# Patient Record
Sex: MALE | Race: BLACK OR AFRICAN AMERICAN | NOT HISPANIC OR LATINO | Employment: OTHER | URBAN - METROPOLITAN AREA
[De-identification: names, ages, dates, MRNs, and addresses within clinical notes are randomized per-mention and may not be internally consistent; named-entity substitution may affect disease eponyms.]

---

## 2018-02-02 ENCOUNTER — HISTORICAL (OUTPATIENT)
Dept: RADIOLOGY | Facility: HOSPITAL | Age: 78
End: 2018-02-02

## 2018-02-02 LAB
ABS NEUT (OLG): 2.09 X10(3)/MCL (ref 2.1–9.2)
ALBUMIN SERPL-MCNC: 4 GM/DL (ref 3.4–5)
ALBUMIN/GLOB SERPL: 1 {RATIO}
ALP SERPL-CCNC: 131 UNIT/L (ref 45–117)
ALT SERPL-CCNC: 33 UNIT/L (ref 16–61)
AST SERPL-CCNC: 24 UNIT/L (ref 15–37)
BASOPHILS NFR BLD MANUAL: 0 %
BILIRUB SERPL-MCNC: 0.3 MG/DL (ref 0.2–1)
BILIRUBIN DIRECT+TOT PNL SERPL-MCNC: 0.1 MG/DL (ref 0–0.2)
BILIRUBIN DIRECT+TOT PNL SERPL-MCNC: 0.2 MG/DL (ref 0–1)
BUN SERPL-MCNC: 19 MG/DL (ref 7–18)
CALCIUM SERPL-MCNC: 9.6 MG/DL (ref 8.5–10.1)
CHLORIDE SERPL-SCNC: 108 MMOL/L (ref 98–107)
CHOLEST SERPL-MCNC: 197 MG/DL (ref 0–200)
CHOLEST/HDLC SERPL: 4.7 {RATIO} (ref 0–5)
CO2 SERPL-SCNC: 27 MMOL/L (ref 21–32)
CREAT SERPL-MCNC: 1.15 MG/DL (ref 0.7–1.3)
EOSINOPHIL NFR BLD MANUAL: 16 % (ref 0–5)
ERYTHROCYTE [DISTWIDTH] IN BLOOD BY AUTOMATED COUNT: 17.9 % (ref 11.5–17)
ERYTHROCYTE [SEDIMENTATION RATE] IN BLOOD: 7 MM/HR (ref 0–20)
GLOBULIN SER-MCNC: 5 GM/DL (ref 2–4)
GLUCOSE SERPL-MCNC: 96 MG/DL (ref 74–106)
GRANULOCYTES NFR BLD MANUAL: 37 %
HCT VFR BLD AUTO: 49.2 % (ref 42–52)
HDLC SERPL-MCNC: 42 MG/DL (ref 35–60)
HGB BLD-MCNC: 15 GM/DL (ref 14–18)
LDLC SERPL CALC-MCNC: 133 MG/DL (ref 0–129)
LYMPHOCYTES NFR BLD MANUAL: 3 %
LYMPHOCYTES NFR BLD MANUAL: 39 % (ref 24–44)
MCH RBC QN AUTO: 22.5 PG (ref 27–31)
MCHC RBC AUTO-ENTMCNC: 30.5 GM/DL (ref 33–36)
MCV RBC AUTO: 73.7 FL (ref 80–94)
MONOCYTES NFR BLD MANUAL: 5 % (ref 4–8)
PLATELET # BLD AUTO: 269 X10(3)/MCL (ref 130–400)
PLATELET # BLD EST: ADEQUATE 10*3/UL
PMV BLD AUTO: 9.3 FL (ref 7.4–10.4)
POTASSIUM SERPL-SCNC: 4.1 MMOL/L (ref 3.5–5.1)
PROT SERPL-MCNC: 9.2 GM/DL (ref 6.4–8.2)
PSA SERPL-MCNC: 2.02 NG/ML (ref 0–4)
RBC # BLD AUTO: 6.68 X10(6)/MCL (ref 4.7–6.1)
RBC MORPH BLD: NORMAL
SODIUM SERPL-SCNC: 143 MMOL/L (ref 136–145)
T3FREE SERPL-MCNC: 2.94 PG/ML (ref 2.18–3.98)
TRIGL SERPL-MCNC: 111 MG/DL (ref 30–150)
TSH SERPL-ACNC: 1.53 MIU/ML (ref 0.36–3.74)
URATE SERPL-MCNC: 6.7 MG/DL (ref 3.5–7.2)
VLDLC SERPL CALC-MCNC: 22 MG/DL
WBC # SPEC AUTO: 6.4 X10(3)/MCL (ref 4.5–11.5)

## 2019-04-18 ENCOUNTER — HISTORICAL (OUTPATIENT)
Dept: CARDIOLOGY | Facility: HOSPITAL | Age: 79
End: 2019-04-18

## 2019-04-18 LAB
ABS NEUT (OLG): 2.89 X10(3)/MCL (ref 2.1–9.2)
ACANTHOCYTES (OLG): 1
ANISOCYTOSIS BLD QL SMEAR: 1
BASOPHILS NFR BLD MANUAL: 2 % (ref 0–2)
BUN SERPL-MCNC: 39 MG/DL (ref 7–18)
CALCIUM SERPL-MCNC: 10 MG/DL (ref 8.5–10.1)
CHLORIDE SERPL-SCNC: 111 MMOL/L (ref 98–107)
CO2 SERPL-SCNC: 23 MMOL/L (ref 21–32)
CREAT SERPL-MCNC: 1.31 MG/DL (ref 0.7–1.3)
CREAT/UREA NIT SERPL: 29.8
EOSINOPHIL NFR BLD MANUAL: 28 % (ref 0–8)
ERYTHROCYTE [DISTWIDTH] IN BLOOD BY AUTOMATED COUNT: 17.3 % (ref 11.5–17)
GLUCOSE SERPL-MCNC: 106 MG/DL (ref 74–106)
HCT VFR BLD AUTO: 40 % (ref 42–52)
HGB BLD-MCNC: 11.6 GM/DL (ref 14–18)
LYMPHOCYTES NFR BLD MANUAL: 17 % (ref 13–40)
MACROCYTES BLD QL SMEAR: 1 /MCL
MCH RBC QN AUTO: 22.9 PG (ref 27–31)
MCHC RBC AUTO-ENTMCNC: 29 GM/DL (ref 33–36)
MCV RBC AUTO: 79.1 FL (ref 80–94)
MICROCYTES BLD QL SMEAR: 1
MONOCYTES NFR BLD MANUAL: 4 % (ref 2–11)
NEUTROPHILS NFR BLD MANUAL: 49 % (ref 47–80)
NRBC BLD MANUAL-RTO: 4 %
OVALOCYTES BLD QL SMEAR: 1
PLATELET # BLD AUTO: 172 X10(3)/MCL (ref 130–400)
PLATELET # BLD EST: NORMAL 10*3/UL
PMV BLD AUTO: ABNORMAL FL (ref 7.4–10.4)
POIKILOCYTOSIS BLD QL SMEAR: 2
POTASSIUM SERPL-SCNC: 4.3 MMOL/L (ref 3.5–5.1)
RBC # BLD AUTO: 5.06 X10(6)/MCL (ref 4.7–6.1)
RBC MORPH BLD: ABNORMAL
SCHISTOCYTES BLD QL AUTO: 1
SODIUM SERPL-SCNC: 140 MMOL/L (ref 136–145)
TARGETS BLD QL SMEAR: 1
WBC # SPEC AUTO: 6.3 X10(3)/MCL (ref 4.5–11.5)

## 2019-06-30 ENCOUNTER — HISTORICAL (OUTPATIENT)
Dept: LAB | Facility: HOSPITAL | Age: 79
End: 2019-06-30

## 2019-06-30 LAB
ABS NEUT (OLG): 2.85 X10(3)/MCL (ref 2.1–9.2)
BUN SERPL-MCNC: 27 MG/DL (ref 7–18)
CALCIUM SERPL-MCNC: 9.6 MG/DL (ref 8.5–10.1)
CHLORIDE SERPL-SCNC: 112 MMOL/L (ref 98–107)
CO2 SERPL-SCNC: 24 MMOL/L (ref 21–32)
CREAT SERPL-MCNC: 1.38 MG/DL (ref 0.7–1.3)
CREAT/UREA NIT SERPL: 20
ERYTHROCYTE [DISTWIDTH] IN BLOOD BY AUTOMATED COUNT: 18.4 % (ref 11.5–17)
GLUCOSE SERPL-MCNC: 144 MG/DL (ref 74–106)
HCT VFR BLD AUTO: 39.5 % (ref 42–52)
HGB BLD-MCNC: 12.1 GM/DL (ref 14–18)
INR PPP: 1.2 (ref 0–1.3)
MAGNESIUM SERPL-MCNC: 1.9 MG/DL (ref 1.8–2.4)
MCH RBC QN AUTO: 22.6 PG (ref 27–31)
MCHC RBC AUTO-ENTMCNC: 30.6 GM/DL (ref 33–36)
MCV RBC AUTO: 73.7 FL (ref 80–94)
PLATELET # BLD AUTO: 201 X10(3)/MCL (ref 130–400)
PMV BLD AUTO: ABNORMAL FL (ref 7.4–10.4)
POTASSIUM SERPL-SCNC: 4.1 MMOL/L (ref 3.5–5.1)
PROTHROMBIN TIME: 15.4 SECOND(S) (ref 12–14)
RBC # BLD AUTO: 5.36 X10(6)/MCL (ref 4.7–6.1)
SODIUM SERPL-SCNC: 142 MMOL/L (ref 136–145)
WBC # SPEC AUTO: 5.9 X10(3)/MCL (ref 4.5–11.5)

## 2019-07-02 ENCOUNTER — HISTORICAL (OUTPATIENT)
Dept: CARDIOLOGY | Facility: HOSPITAL | Age: 79
End: 2019-07-02

## 2020-08-18 ENCOUNTER — HISTORICAL (OUTPATIENT)
Dept: RESPIRATORY THERAPY | Facility: HOSPITAL | Age: 80
End: 2020-08-18

## 2022-02-15 ENCOUNTER — HISTORICAL (OUTPATIENT)
Dept: ADMINISTRATIVE | Facility: HOSPITAL | Age: 82
End: 2022-02-15

## 2022-07-08 ENCOUNTER — HOSPITAL ENCOUNTER (EMERGENCY)
Facility: HOSPITAL | Age: 82
Discharge: HOME | End: 2022-07-08
Attending: EMERGENCY MEDICINE
Payer: MEDICARE

## 2022-07-08 VITALS
DIASTOLIC BLOOD PRESSURE: 60 MMHG | SYSTOLIC BLOOD PRESSURE: 105 MMHG | HEIGHT: 71 IN | HEART RATE: 62 BPM | OXYGEN SATURATION: 99 % | TEMPERATURE: 98 F | RESPIRATION RATE: 20 BRPM

## 2022-07-08 DIAGNOSIS — K04.7 DENTAL ABSCESS: Primary | ICD-10-CM

## 2022-07-08 PROCEDURE — 99284 EMERGENCY DEPT VISIT MOD MDM: CPT

## 2022-07-08 RX ORDER — HYDROCODONE BITARTRATE AND ACETAMINOPHEN 7.5; 325 MG/1; MG/1
1 TABLET ORAL EVERY 6 HOURS PRN
Qty: 20 TABLET | Refills: 0 | Status: SHIPPED | OUTPATIENT
Start: 2022-07-08 | End: 2022-07-13

## 2022-07-08 RX ORDER — AMOXICILLIN AND CLAVULANATE POTASSIUM 875; 125 MG/1; MG/1
1 TABLET, FILM COATED ORAL 2 TIMES DAILY
Qty: 20 TABLET | Refills: 0 | Status: SHIPPED | OUTPATIENT
Start: 2022-07-08 | End: 2022-07-18

## 2022-07-08 NOTE — ED PROVIDER NOTES
Encounter Date: 7/8/2022       History     Chief Complaint   Patient presents with    Dental Pain     Reports dental pain for 2 weeks. Swelling noted to L face     The history is provided by the patient. No  was used.   Dental Pain  The primary symptoms include mouth pain. Primary symptoms do not include fever, shortness of breath or sore throat. The symptoms began several days ago. The symptoms are worsening. The symptoms are new. The symptoms occur constantly.   Mouth pain began 5 - 7 days ago. Mouth pain occurs constantly. Mouth pain is worsening. Affected locations include: gum(s) and teeth.   Additional symptoms include: gum swelling, gum tenderness, jaw pain and facial swelling. Medical issues include: periodontal disease.     Review of patient's allergies indicates:  No Known Allergies  No past medical history on file. HTN, HLD, CAD  No past surgical history on file. noncontributory  No family history on file.     Review of Systems   Constitutional: Negative for fever.   HENT: Positive for facial swelling. Negative for sore throat.    Respiratory: Negative for shortness of breath.    Cardiovascular: Negative for chest pain.   Gastrointestinal: Negative for nausea.   Genitourinary: Negative for dysuria.   Musculoskeletal: Negative for back pain.   Skin: Negative for rash.   Neurological: Negative for weakness.   Hematological: Does not bruise/bleed easily.       Physical Exam     Initial Vitals [07/08/22 1059]   BP Pulse Resp Temp SpO2   (!) 100/59 (!) 59 18 98.4 °F (36.9 °C) 98 %      MAP       --         Physical Exam    Nursing note and vitals reviewed.  Constitutional: He appears well-developed and well-nourished.   HENT:   Head: Normocephalic and atraumatic.       Right Ear: External ear normal.   Left Ear: External ear normal.   Nose: Nose normal.   Mouth/Throat:       Eyes: Conjunctivae and EOM are normal. Pupils are equal, round, and reactive to light.   Neck: Neck supple.   Normal  range of motion.  Cardiovascular: Normal rate, regular rhythm, normal heart sounds and intact distal pulses.   Pulmonary/Chest: Breath sounds normal.   Abdominal: Abdomen is soft. Bowel sounds are normal.   Musculoskeletal:         General: Normal range of motion.      Cervical back: Normal range of motion and neck supple.     Neurological: He is alert and oriented to person, place, and time. He has normal strength. GCS score is 15. GCS eye subscore is 4. GCS verbal subscore is 5. GCS motor subscore is 6.   Skin: Skin is warm and dry. Capillary refill takes less than 2 seconds.   Psychiatric: He has a normal mood and affect. His behavior is normal. Judgment and thought content normal.         ED Course   Procedures  Labs Reviewed - No data to display       Imaging Results    None          Medications - No data to display      Pt is on Eliquis - will avoid NSAID's due to bleeding risk.                 Clinical Impression:   Final diagnoses:  [K04.7] Dental abscess (Primary)          ED Disposition Condition    Discharge Stable        ED Prescriptions     Medication Sig Dispense Start Date End Date Auth. Provider    amoxicillin-clavulanate 875-125mg (AUGMENTIN) 875-125 mg per tablet Take 1 tablet by mouth 2 (two) times daily. for 10 days 20 tablet 7/8/2022 7/18/2022 Demarcus Nice MD    HYDROcodone-acetaminophen (NORCO) 7.5-325 mg per tablet Take 1 tablet by mouth every 6 (six) hours as needed for Pain. 20 tablet 7/8/2022 7/13/2022 Demarcus Nice MD        Follow-up Information     Follow up With Specialties Details Why Contact Info    Follow up with a dentist as soon as possible               Demarcus Nice MD  07/08/22 [id number]

## 2022-09-02 ENCOUNTER — HOSPITAL ENCOUNTER (EMERGENCY)
Facility: HOSPITAL | Age: 82
Discharge: HOME | End: 2022-09-02
Attending: EMERGENCY MEDICINE
Payer: MEDICARE

## 2022-09-02 VITALS
WEIGHT: 205 LBS | HEART RATE: 86 BPM | SYSTOLIC BLOOD PRESSURE: 145 MMHG | HEIGHT: 71 IN | DIASTOLIC BLOOD PRESSURE: 74 MMHG | TEMPERATURE: 98 F | OXYGEN SATURATION: 97 % | RESPIRATION RATE: 16 BRPM | BODY MASS INDEX: 28.7 KG/M2

## 2022-09-02 DIAGNOSIS — M54.50 ACUTE BILATERAL LOW BACK PAIN WITHOUT SCIATICA: ICD-10-CM

## 2022-09-02 DIAGNOSIS — M54.50 ACUTE EXACERBATION OF CHRONIC LOW BACK PAIN: Primary | ICD-10-CM

## 2022-09-02 DIAGNOSIS — G89.29 ACUTE EXACERBATION OF CHRONIC LOW BACK PAIN: Primary | ICD-10-CM

## 2022-09-02 PROCEDURE — 99283 EMERGENCY DEPT VISIT LOW MDM: CPT | Mod: 25

## 2022-09-02 PROCEDURE — 25000003 PHARM REV CODE 250: Performed by: EMERGENCY MEDICINE

## 2022-09-02 RX ORDER — HYDROCODONE BITARTRATE AND ACETAMINOPHEN 5; 325 MG/1; MG/1
1 TABLET ORAL EVERY 6 HOURS PRN
Qty: 12 TABLET | Refills: 0 | Status: SHIPPED | OUTPATIENT
Start: 2022-09-02

## 2022-09-02 RX ORDER — HYDROCODONE BITARTRATE AND ACETAMINOPHEN 5; 325 MG/1; MG/1
1 TABLET ORAL
Status: COMPLETED | OUTPATIENT
Start: 2022-09-02 | End: 2022-09-02

## 2022-09-02 RX ADMIN — HYDROCODONE BITARTRATE AND ACETAMINOPHEN 1 TABLET: 5; 325 TABLET ORAL at 09:09

## 2022-09-02 NOTE — ED PROVIDER NOTES
Encounter Date: 9/2/2022       History     Chief Complaint   Patient presents with    Back Pain     81-year-old male with a history of chronic back pain states that he did some yard work last week and has been having back pain much worse than usual.  No radiation of the pain in his legs, no difficulty walking, but the pain is severe and the mid low back.  He states he knows he has arthritis but he still bends over and picks things up, and then hurt himself.  No abdominal pain or other symptoms.  no bowel or bladder complaints.      Review of patient's allergies indicates:  No Known Allergies  Past Medical History:   Diagnosis Date    Hypertension      Past Surgical History:   Procedure Laterality Date    INSERTION OF PACEMAKER       No family history on file.  Social History     Tobacco Use    Smoking status: Former     Types: Cigarettes    Smokeless tobacco: Never     Review of Systems   Musculoskeletal:  Positive for back pain.   All other systems reviewed and are negative.    Physical Exam     Initial Vitals [09/02/22 0849]   BP Pulse Resp Temp SpO2   (!) 145/74 86 20 97.7 °F (36.5 °C) 97 %      MAP       --         Physical Exam    Nursing note and vitals reviewed.  Constitutional: Vital signs are normal. He appears well-developed and well-nourished.   HENT:   Head: Normocephalic and atraumatic.   Eyes: Pupils are equal, round, and reactive to light.   Neck: Neck supple.   Cardiovascular:  Normal rate, regular rhythm and normal heart sounds.           Pulmonary/Chest: Breath sounds normal. No respiratory distress.   Musculoskeletal:      Cervical back: Neck supple. No edema or erythema.      Comments: Ambulatory without assistance, mild tenderness to the entire lumbar region, no weakness or numbness in his legs     Neurological: He is alert.   Skin: Skin is warm and dry.       ED Course   Procedures  Labs Reviewed - No data to display       Imaging Results              X-Ray Lumbar Spine Ap And Lateral (Final  result)  Result time 09/02/22 09:54:07      Final result by So Valencia MD (09/02/22 09:54:07)                   Impression:      1. No acute abnormality identified.  2. Degenerative changes of the lumbar spine.      Electronically signed by: So Valencia  Date:    09/02/2022  Time:    09:54               Narrative:    EXAMINATION:  XR LUMBAR SPINE AP AND LATERAL    CLINICAL HISTORY:  back pain;    COMPARISON:  None.    FINDINGS:  There are 5 non-rib-bearing lumbar type vertebral bodies.  There is straightening of normal lumbar lordosis.  The vertebral heights are maintained.  There is severe disc height loss at L3 through L5, moderate at L2-L3 and L5-S1, with multilevel marginal osteophyte formation.  There is moderate to severe facet arthropathy.  Vascular calcifications are noted.                                       Medications   HYDROcodone-acetaminophen 5-325 mg per tablet 1 tablet (1 tablet Oral Given 9/2/22 0938)                          Clinical Impression:   Final diagnoses:  [M54.50, G89.29] Acute exacerbation of chronic low back pain (Primary)  [M54.50] Acute bilateral low back pain without sciatica      ED Disposition Condition    Discharge Stable          ED Prescriptions       Medication Sig Dispense Start Date End Date Auth. Provider    HYDROcodone-acetaminophen (NORCO) 5-325 mg per tablet Take 1 tablet by mouth every 6 (six) hours as needed for Pain. 12 tablet 9/2/2022 -- Selena Morris MD          Follow-up Information       Follow up With Specialties Details Why Contact Info    Braxton Bingham MD Family Medicine Schedule an appointment as soon as possible for a visit in 1 week  [address] [facility] [address]  [phone]               Selena Morris MD  09/02/22 2338

## 2023-10-12 ENCOUNTER — LAB VISIT (OUTPATIENT)
Dept: LAB | Facility: HOSPITAL | Age: 83
End: 2023-10-12
Attending: INTERNAL MEDICINE
Payer: MEDICARE

## 2023-10-12 DIAGNOSIS — I10 HYPERTENSION, UNSPECIFIED TYPE: Primary | ICD-10-CM

## 2023-10-12 LAB
ANION GAP SERPL CALC-SCNC: 11 MEQ/L
BUN SERPL-MCNC: 21.8 MG/DL (ref 8.4–25.7)
CALCIUM SERPL-MCNC: 10.3 MG/DL (ref 8.8–10)
CHLORIDE SERPL-SCNC: 109 MMOL/L (ref 98–107)
CO2 SERPL-SCNC: 24 MMOL/L (ref 23–31)
CREAT SERPL-MCNC: 1.23 MG/DL (ref 0.73–1.18)
CREAT/UREA NIT SERPL: 18
GFR SERPLBLD CREATININE-BSD FMLA CKD-EPI: 59 MLS/MIN/1.73/M2
GLUCOSE SERPL-MCNC: 113 MG/DL (ref 82–115)
POTASSIUM SERPL-SCNC: 3.8 MMOL/L (ref 3.5–5.1)
SODIUM SERPL-SCNC: 144 MMOL/L (ref 136–145)

## 2023-10-12 PROCEDURE — 80048 BASIC METABOLIC PNL TOTAL CA: CPT

## 2023-10-12 PROCEDURE — 36415 COLL VENOUS BLD VENIPUNCTURE: CPT

## 2024-05-15 ENCOUNTER — OFFICE VISIT (OUTPATIENT)
Dept: INTERNAL MEDICINE | Facility: CLINIC | Age: 84
End: 2024-05-15
Payer: MEDICARE

## 2024-05-15 VITALS
TEMPERATURE: 97 F | BODY MASS INDEX: 27.02 KG/M2 | OXYGEN SATURATION: 97 % | HEIGHT: 71 IN | HEART RATE: 60 BPM | SYSTOLIC BLOOD PRESSURE: 130 MMHG | DIASTOLIC BLOOD PRESSURE: 66 MMHG | WEIGHT: 193 LBS

## 2024-05-15 DIAGNOSIS — I10 PRIMARY HYPERTENSION: Chronic | ICD-10-CM

## 2024-05-15 DIAGNOSIS — I25.10 CORONARY ARTERY DISEASE INVOLVING NATIVE CORONARY ARTERY OF NATIVE HEART WITHOUT ANGINA PECTORIS: ICD-10-CM

## 2024-05-15 DIAGNOSIS — Z76.89 ENCOUNTER TO ESTABLISH CARE: Primary | ICD-10-CM

## 2024-05-15 DIAGNOSIS — F51.01 PRIMARY INSOMNIA: Chronic | ICD-10-CM

## 2024-05-15 DIAGNOSIS — M1A.0711 IDIOPATHIC CHRONIC GOUT OF RIGHT FOOT WITH TOPHUS: ICD-10-CM

## 2024-05-15 DIAGNOSIS — I48.20 ATRIAL FIBRILLATION, CHRONIC: ICD-10-CM

## 2024-05-15 DIAGNOSIS — I73.9 PAD (PERIPHERAL ARTERY DISEASE): ICD-10-CM

## 2024-05-15 DIAGNOSIS — M51.37 DDD (DEGENERATIVE DISC DISEASE), LUMBOSACRAL: ICD-10-CM

## 2024-05-15 PROBLEM — M51.379 DDD (DEGENERATIVE DISC DISEASE), LUMBOSACRAL: Status: ACTIVE | Noted: 2024-05-15

## 2024-05-15 PROBLEM — Z87.891 HISTORY OF TOBACCO ABUSE: Status: ACTIVE | Noted: 2024-05-15

## 2024-05-15 PROBLEM — M10.9 GOUT: Status: ACTIVE | Noted: 2024-05-15

## 2024-05-15 PROCEDURE — 3078F DIAST BP <80 MM HG: CPT | Mod: CPTII,,, | Performed by: NURSE PRACTITIONER

## 2024-05-15 PROCEDURE — 1159F MED LIST DOCD IN RCRD: CPT | Mod: CPTII,,, | Performed by: NURSE PRACTITIONER

## 2024-05-15 PROCEDURE — 3075F SYST BP GE 130 - 139MM HG: CPT | Mod: CPTII,,, | Performed by: NURSE PRACTITIONER

## 2024-05-15 PROCEDURE — 99215 OFFICE O/P EST HI 40 MIN: CPT | Mod: PBBFAC | Performed by: NURSE PRACTITIONER

## 2024-05-15 PROCEDURE — 1101F PT FALLS ASSESS-DOCD LE1/YR: CPT | Mod: CPTII,,, | Performed by: NURSE PRACTITIONER

## 2024-05-15 PROCEDURE — 3288F FALL RISK ASSESSMENT DOCD: CPT | Mod: CPTII,,, | Performed by: NURSE PRACTITIONER

## 2024-05-15 PROCEDURE — 1160F RVW MEDS BY RX/DR IN RCRD: CPT | Mod: CPTII,,, | Performed by: NURSE PRACTITIONER

## 2024-05-15 PROCEDURE — 99204 OFFICE O/P NEW MOD 45 MIN: CPT | Mod: S$PBB,,, | Performed by: NURSE PRACTITIONER

## 2024-05-15 PROCEDURE — 1125F AMNT PAIN NOTED PAIN PRSNT: CPT | Mod: CPTII,,, | Performed by: NURSE PRACTITIONER

## 2024-05-15 RX ORDER — TRAZODONE HYDROCHLORIDE 50 MG/1
50 TABLET ORAL NIGHTLY
Qty: 90 TABLET | Refills: 1 | Status: SHIPPED | OUTPATIENT
Start: 2024-05-15

## 2024-05-15 RX ORDER — FUROSEMIDE 20 MG/1
20 TABLET ORAL
COMMUNITY
Start: 2024-05-07

## 2024-05-15 RX ORDER — ALLOPURINOL 300 MG/1
300 TABLET ORAL DAILY
Qty: 90 TABLET | Refills: 1 | Status: SHIPPED | OUTPATIENT
Start: 2024-05-15

## 2024-05-15 RX ORDER — LOSARTAN POTASSIUM 100 MG/1
100 TABLET ORAL
COMMUNITY
Start: 2024-05-07

## 2024-05-15 RX ORDER — ALLOPURINOL 300 MG/1
300 TABLET ORAL
COMMUNITY
Start: 2024-05-07 | End: 2024-05-15 | Stop reason: SDUPTHER

## 2024-05-15 RX ORDER — TRAZODONE HYDROCHLORIDE 50 MG/1
50 TABLET ORAL NIGHTLY
COMMUNITY
Start: 2024-03-25 | End: 2024-05-15 | Stop reason: SDUPTHER

## 2024-05-15 RX ORDER — DICLOFENAC SODIUM 10 MG/G
2 GEL TOPICAL 3 TIMES DAILY PRN
Qty: 100 G | Refills: 1 | Status: SHIPPED | OUTPATIENT
Start: 2024-05-15

## 2024-05-15 RX ORDER — ISOSORBIDE MONONITRATE 60 MG/1
60 TABLET, EXTENDED RELEASE ORAL
COMMUNITY
Start: 2024-05-07

## 2024-05-15 RX ORDER — LEVOCETIRIZINE DIHYDROCHLORIDE 5 MG/1
5 TABLET, FILM COATED ORAL
COMMUNITY
Start: 2024-05-07

## 2024-05-15 RX ORDER — APIXABAN 5 MG/1
5 TABLET, FILM COATED ORAL 2 TIMES DAILY
COMMUNITY
Start: 2024-05-07

## 2024-05-15 RX ORDER — AMLODIPINE BESYLATE 10 MG/1
10 TABLET ORAL
COMMUNITY
Start: 2024-05-07

## 2024-05-15 RX ORDER — ISOSORBIDE MONONITRATE 30 MG/1
30 TABLET, EXTENDED RELEASE ORAL
COMMUNITY
Start: 2024-05-07

## 2024-05-15 RX ORDER — ATORVASTATIN CALCIUM 40 MG/1
40 TABLET, FILM COATED ORAL
COMMUNITY
Start: 2024-05-07

## 2024-05-15 RX ORDER — CARVEDILOL 3.12 MG/1
3.12 TABLET ORAL 2 TIMES DAILY
COMMUNITY

## 2024-05-15 NOTE — PROGRESS NOTES
Charley Townsend, GABY   [facility]  [address] [facility] [address]      PATIENT NAME: Hung Ugarte  : 1940  DATE: 5/15/24  MRN: [id number]      Reason for Visit / Chief Complaint: Establish Care       History of Present Illness / Problem Focused Workflow     uHng Ugarte is a 83 y.o. Black or  male presents to the clinic to establish primary care. Medical problems include HTN, CAD, PAD (stents BLE), A. Fib, pacemaker, gout, DDD lumbosacral, LBP w/sciatica, insomnia, and hx tobacco abuse (quit about 20 yrs ago). Followed by Dr. Yao Daniels, cardiology. Pt unable to read or write.    Pt presents to establish primary care; former PCP was Carmina Kruger NP in [place]. Pt reports LV with PCP was in March. Last visit with cardio was April with 6 month f/u. Had labs drawn with last cardio visit. Reports med compliance. Attempts low sodium diet. BP at goal. Takes trazodone for insomnia. Reports intermittent right LBP, onset several years, worse with certain movements and somewhat improves with tylenol. Has continued smoking cessation since probably in the ; pt unsure of how long he actually smoked. Pt is not fasting today. Denies chest pain, shortness of breath, cough, fever, headache, dizziness, weakness, abdominal pain, nausea, vomiting, diarrhea, constipation, dysuria, depression, anxiety, SI/HI.    Review of Systems     Review of Systems     See HPI for details    Constitutional: Denies Change in appetite. Denies Chills. Denies Fever. Denies Night sweats.  Eye: Denies Blurred vision. Denies Discharge. Denies Eye pain.  ENT: Denies Decreased hearing. Denies Sore throat. Denies Swollen glands.  Respiratory: Denies Cough. Denies Shortness of breath. Denies Shortness of breath with exertion. Denies Wheezing.  Cardiovascular: Denies Chest pain at rest. Denies Chest pain with exertion. Denies Irregular  heartbeat. Denies Palpitations. Denies Edema.  Gastrointestinal: Denies Abdominal pain. Denies Diarrhea. Denies Nausea. Denies Vomiting. Denies Hematemesis or Hematochezia.  Genitourinary: Denies Dysuria. Denies Urinary frequency. Denies Urinary urgency. Denies Blood in urine.  Endocrine: Denies Cold intolerance. Denies Excessive thirst. Denies Heat intolerance. Denies Weight loss. Denies Weight gain.  Musculoskeletal: Admits Painful joints. Denies Weakness.  Integumentary: Denies Rash. Denies Itching. Denies Dry skin.  Neurologic: Denies Dizziness. Denies Fainting. Denies Headache.  Psychiatric: Denies Depression. Denies Anxiety. Denies Suicidal/Homicidal ideations.    All Other ROS: Negative except as stated in HPI.     Medical / Surgical / Social / Family History       -------------------------------------    Hypertension        Past Surgical History:   Procedure Laterality Date    INSERTION OF PACEMAKER         Social History     Socioeconomic History    Marital status:    Tobacco Use    Smoking status: Former     Types: Cigarettes    Smokeless tobacco: Never   Substance and Sexual Activity    Alcohol use: Not Currently    Drug use: Never    Sexual activity: Not Currently        No family history on file.     Medications and Allergies     Medications  Current Outpatient Medications   Medication Instructions    allopurinoL (ZYLOPRIM) 300 mg, Oral, Daily    amLODIPine (NORVASC) 10 mg, Oral    atorvastatin (LIPITOR) 40 mg, Oral    carvediloL (COREG) 3.125 mg, Oral, 2 times daily    diclofenac sodium (VOLTAREN) 2 g, Topical (Top), 3 times daily PRN    ELIQUIS 5 mg, Oral, 2 times daily    furosemide (LASIX) 20 mg, Oral    HYDROcodone-acetaminophen (NORCO) 5-325 mg per tablet 1 tablet, Oral, Every 6 hours PRN    isosorbide mononitrate (IMDUR) 30 mg, Oral    isosorbide mononitrate (IMDUR) 60 mg, Oral    levocetirizine (XYZAL) 5 mg, Oral    losartan (COZAAR) 100 mg, Oral    traZODone (DESYREL) 50 mg, Oral,  "Nightly         Allergies  Review of patient's allergies indicates:  No Known Allergies    Physical Examination     /66 (BP Location: Right arm, Patient Position: Sitting, BP Method: Large (Automatic))   Pulse 60   Temp 97.3 °F (36.3 °C) (Oral)   Ht 5' 11" (1.803 m)   Wt 87.5 kg (193 lb)   SpO2 97%   BMI 26.92 kg/m²     Physical Exam  Cardiovascular:      Rate and Rhythm: Rhythm irregular.      Comments: Pacemaker LCW  Musculoskeletal:      Lumbar back: Spasms and tenderness present. Decreased range of motion.        Back:          General: Alert and oriented, No acute distress.  Head: Normocephalic, Atraumatic.  Eye: Pupils are equal, round and reactive to light, Extraocular movements are intact, Sclera non-icteric.  Ears/Nose/Throat: Normal, Mucosa moist,Clear.  Neck/Thyroid: Supple, Non-tender, No carotid bruit, No lymphadenopathy, No JVD, Full range of motion.  Respiratory: Clear to auscultation bilaterally; No wheezes, rales or rhonchi,Non-labored respirations, Symmetrical chest wall expansion.  Cardiovascular: S1/S2 normal, No murmurs, rubs or gallops.  Gastrointestinal: Soft, Non-tender, Non-distended, Normal bowel sounds, No palpable organomegaly.  Integumentary: Warm, Dry, Intact, No suspicious lesions or rashes.  Extremities: No clubbing, cyanosis or edema  Neurologic: No focal deficits, Cranial Nerves II-XII are grossly intact, Motor strength normal upper and lower extremities, Sensory exam intact.  Psychiatric: Normal interaction, Coherent speech, Appropriate affect       Results     Lab Results   Component Value Date    WBC 7.2 02/15/2022    HGB 11.2 02/15/2022    HCT 36.3 02/15/2022     02/15/2022    CHOL 197 02/02/2018    TRIG 111 02/02/2018    ALT 18 02/15/2022    AST 20 02/15/2022     10/12/2023    K 3.8 10/12/2023    CREATININE 1.23 (H) 10/12/2023    BUN 21.8 10/12/2023    CO2 24 10/12/2023    TSH 1.0834 02/15/2022    PSA 2.02 02/02/2018    INR 1.5 (L) 08/10/2020     X-Ray " Lumbar Spine Ap And Lateral  Order: [id number]  Status: Final result       Visible to patient: No (inaccessible in Patient Portal)       Next appt: None    0 Result Notes  Details    Reading Physician Reading Date Result Priority   So Valencia MD  [phone] 9/2/2022 STAT     Narrative & Impression  EXAMINATION:  XR LUMBAR SPINE AP AND LATERAL     CLINICAL HISTORY:  back pain;     COMPARISON:  None.     FINDINGS:  There are 5 non-rib-bearing lumbar type vertebral bodies.  There is straightening of normal lumbar lordosis.  The vertebral heights are maintained.  There is severe disc height loss at L3 through L5, moderate at L2-L3 and L5-S1, with multilevel marginal osteophyte formation.  There is moderate to severe facet arthropathy.  Vascular calcifications are noted.     Impression:     1. No acute abnormality identified.  2. Degenerative changes of the lumbar spine.        Electronically signed by:So Valencia  Date:                                            09/02/2022  Time:                                           09:54       Assessment and Plan (including Health Maintenance)     Plan:     1. Encounter to establish care  EMR reviewed.  Records requested.  Is not fasting today; labs ordered prior to next visit.     2. DDD (degenerative disc disease), lumbosacral  Declines referral to PT at this time.  Rx diclofenac gel prn.  Pt unsure if has renal problems for NSAID.  Perform back stretches daily.   Avoid activities than increase back pain or stiffness.   Apply heating pad, ice pack, and BioFreeze as needed; alternate every 15-20 minutes.   Massage back to loosen muscles.   Continue tylenol arthritis as needed.      3. 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  Stay well hydrated by increasing water intake throughout the day.  Stressed importance of exercise and weight loss to maintain BMI <30.  Instructed on low purine diet; avoid alcohol, sodas, organ/glandular  meats (liver, kidney, sweetbreads). Limit seafood and red meat intake.  Eat a balanced diet of fruits, vegetables, complex carbohydrates, and lean sources of protein (boneless/skinless chicken breasts, salmon, lentils, low fat dairy).  Discussed possible benefit of OTC Vitamin C supplementation.       4. PAD (peripheral artery disease)  Keep cardio visits as scheduled.   Follow cardiac, ADA or DASH diet as advised.  Encouraged aerobic exercise 30 minutes per day 5 times a week.  Maintain healthy weight with a BMI goal of <30.   Discussed appropriate lifestyle changes including smoking cessation, exercise, weight loss, and dietary modifications.      5. Coronary artery disease involving native coronary artery of native heart without angina pectoris  Continue imdur, coreg and lipitor.  Keep cardio appts/diagnostics as scheduled.  Last ECHO / EKG / Stress Test / JESUS  Stressed importance of adequate LDL, HA1C, and BP control.  Discussed appropriate lifestyle changes including smoking cessation, exercise, weight loss, and dietary modifications.  ED precautions reviewed including SOB, VEGA, chest pain, dizziness, near syncope, palpitations, or jaw/back/neck discomfort.     - Lipid Panel; Future    6. Primary hypertension  At goal.  Continue amlodipine and losartan.  Follow a low sodium (less than 2 grams of sodium per day), DASH diet.   Continue medications as prescribed.  Monitor blood pressure and report any consistent values greater than 140/90 and keep a log.  Encouraged continued smoking cessation to aid in BP reduction and co-morbidities.   Maintain healthy weight with a BMI goal of <30.   Aerobic exercise for 150 minutes per week (or 5 days a week for 30 minutes each day).    - CBC Auto Differential; Future  - Comprehensive Metabolic Panel; Future  - Microalbumin/Creatinine Ratio, Urine; Future  - TSH; Future  - Urinalysis; Future    7. Primary insomnia  Refilled trazodone.  Avoid caffeine, alcohol and  stimulants.  Do not use illicit drugs.  Practice positive phrases and repeat throughout the day, yoga, lavender scents or Chamomile tea to promote relaxation.  Set healthy boundaries, avoid people and conversations that increase stress.  Power down electronic devices at least one hour prior to bedtime.  Keep room dark; use eye mask or relaxation sound machine to promote rest.  Sleep hygiene refers to actions that tend to improve and maintain good sleep:  Sleep as long as necessary to feel rested (usually seven to eight hours for adults) and then get out of bed  Maintain a regular sleep schedule, particularly a regular wake-up time in the morning  Avoid smoking or other nicotine intake, particularly during the evening  Avoid daytime naps, especially if they are longer than 20 to 30 minutes or occur late in the day.        Problem List Items Addressed This Visit          Neuro    DDD (degenerative disc disease), lumbosacral       Cardiac/Vascular    Primary hypertension (Chronic)    Relevant Orders    CBC Auto Differential    Comprehensive Metabolic Panel    Microalbumin/Creatinine Ratio, Urine    TSH    Urinalysis    CAD (coronary artery disease)    Relevant Orders    Lipid Panel    PAD (peripheral artery disease)    Atrial fibrillation, chronic       Orthopedic    Gout       Other    Primary insomnia (Chronic)    Encounter to establish care - Primary         Health Maintenance Due   Topic Date Due    TETANUS VACCINE  Never done    Shingles Vaccine (1 of 2) Never done    RSV Vaccine (Age 60+ and Pregnant patients) (1 - 1-dose 60+ series) Never done    Pneumococcal Vaccines (Age 65+) (2 of 2 - PPSV23 or PCV20) 11/11/2020    Lipid Panel  02/02/2023    COVID-19 Vaccine (5 - 2023-24 season) 09/01/2023       Follow up in about 3 months (around 8/15/2024) for Follow up, Lab Results.        Signature:  GABY Perez  [facility]  [address] [facility]  [place] [address]

## 2024-09-09 RX ORDER — AMLODIPINE BESYLATE 10 MG/1
10 TABLET ORAL DAILY
Qty: 30 TABLET | Refills: 0 | Status: SHIPPED | OUTPATIENT
Start: 2024-09-09 | End: 2024-10-09

## 2024-09-09 RX ORDER — LEVOCETIRIZINE DIHYDROCHLORIDE 5 MG/1
5 TABLET, FILM COATED ORAL NIGHTLY
Qty: 30 TABLET | Refills: 0 | Status: SHIPPED | OUTPATIENT
Start: 2024-09-09

## 2024-09-09 NOTE — TELEPHONE ENCOUNTER
Erinn with [facility] pharmacy called requesting expedited refills, due to the weather, on amLODIPine (NORVASC) 10 MG tablet and levocetirizine (XYZAL) 5 MG tablet.      LOV:5/15/24    NOV: 10/3/24

## 2024-09-26 RX ORDER — LEVOCETIRIZINE DIHYDROCHLORIDE 5 MG/1
5 TABLET, FILM COATED ORAL NIGHTLY
Qty: 30 TABLET | Refills: 0 | OUTPATIENT
Start: 2024-09-26

## 2024-10-02 NOTE — PROGRESS NOTES
Jonelle Lin, GABY   [facility]  [address] [facility] [address]      PATIENT NAME: Hung Ugarte  : 1940  DATE: 10/3/24  MRN: [id number]      Patient PCP Information       None on File            Reason for Visit / Chief Complaint: Follow-up       History of Present Illness / Problem Focused Workflow     Hung Ugarte presents to the clinic with Follow-up     83 y.o.  male presents to the clinic. Medical problems include HTN, CAD, PAD (stents BLE), A. Fib, pacemaker, gout, DDD lumbosacral, LBP w/sciatica, insomnia, and hx tobacco abuse (quit about 20 yrs ago). Followed by Dr. Yao Daniels, cardiology. Pt unable to read or write.    10/3/24  Pt presents for routine follow up and lab results, he did not complete labs. Blood pressure is above goal today, he reports compliance with medications.  He does not have a blood pressure cuff at home, agreeable to buying one and keeping blood pressure log and return to clinic in 2 weeks.  Patient also due for blood work, we will complete today due to transportation limitation although he is not fasting.  Denies any gout flare-ups in the last year, compliant with allopurinol.  Compliant with cardiology visits.  Agreeable to flu vaccine and Pneumovax          Review of Systems     Review of Systems   Constitutional:  Negative for fatigue, fever and unexpected weight change.   HENT:  Negative for ear pain, hearing loss, trouble swallowing and voice change.    Respiratory:  Negative for cough and shortness of breath.    Cardiovascular:  Negative for chest pain and palpitations.   Gastrointestinal:  Negative for abdominal pain, diarrhea and vomiting.   Genitourinary:  Negative for dysuria.   Musculoskeletal:  Negative for gait problem.   Skin:  Negative for rash and wound.   Neurological:  Negative for weakness.   Psychiatric/Behavioral:  Negative for suicidal ideas.   "        Medications and Allergies     Medications  Current Outpatient Medications   Medication Instructions    allopurinoL (ZYLOPRIM) 300 mg, Oral, Daily    amLODIPine (NORVASC) 10 mg, Oral, Daily    atorvastatin (LIPITOR) 40 mg    carvediloL (COREG) 3.125 mg, 2 times daily    diclofenac sodium (VOLTAREN) 2 g, Topical (Top), 3 times daily PRN    ELIQUIS 5 mg, 2 times daily    furosemide (LASIX) 20 mg    HYDROcodone-acetaminophen (NORCO) 5-325 mg per tablet 1 tablet, Oral, Every 6 hours PRN    isosorbide mononitrate (IMDUR) 30 mg    isosorbide mononitrate (IMDUR) 60 mg    levocetirizine (XYZAL) 5 mg, Oral, Nightly    losartan (COZAAR) 100 mg    traZODone (DESYREL) 50 mg, Oral, Nightly         Allergies  Review of patient's allergies indicates:  No Known Allergies    Physical Examination     Visit Vitals  BP (!) 147/77   Pulse 82   Temp 97.8 °F (36.6 °C) (Oral)   Resp 16   Ht 5' 11" (1.803 m)   Wt 88.6 kg (195 lb 6.4 oz)   SpO2 97%   BMI 27.25 kg/m²       Physical Exam  Vitals and nursing note reviewed.   Constitutional:       General: He is not in acute distress.     Appearance: He is not ill-appearing.   HENT:      Head: Normocephalic and atraumatic.      Mouth/Throat:      Mouth: Mucous membranes are moist.      Pharynx: Oropharynx is clear.   Eyes:      General: No scleral icterus.     Extraocular Movements: Extraocular movements intact.      Conjunctiva/sclera: Conjunctivae normal.      Pupils: Pupils are equal, round, and reactive to light.   Neck:      Vascular: No carotid bruit.   Cardiovascular:      Rate and Rhythm: Normal rate and regular rhythm.      Heart sounds: No murmur heard.     No friction rub. No gallop.   Pulmonary:      Effort: Pulmonary effort is normal. No respiratory distress.      Breath sounds: Normal breath sounds. No wheezing, rhonchi or rales.   Abdominal:      General: Abdomen is flat. Bowel sounds are normal. There is no distension.      Palpations: Abdomen is soft. There is no mass.    "   Tenderness: There is no abdominal tenderness.   Musculoskeletal:         General: Normal range of motion.      Cervical back: Normal range of motion and neck supple.   Skin:     General: Skin is warm and dry.   Neurological:      General: No focal deficit present.      Mental Status: He is alert.   Psychiatric:         Mood and Affect: Mood normal.           Results     Lab Results   Component Value Date    WBC 7.2 02/15/2022    RBC 4.83 02/15/2022    HGB 11.2 02/15/2022    HCT 36.3 02/15/2022    MCV 75.2 02/15/2022    MCH 23.2 02/15/2022    MCHC 30.9 02/15/2022    RDW 20.8 02/15/2022     02/15/2022    MPV ---- 02/15/2022     CMP  Sodium   Date Value Ref Range Status   10/12/2023 144 136 - 145 mmol/L Final     Potassium   Date Value Ref Range Status   10/12/2023 3.8 3.5 - 5.1 mmol/L Final     Chloride   Date Value Ref Range Status   10/12/2023 109 (H) 98 - 107 mmol/L Final     CO2   Date Value Ref Range Status   10/12/2023 24 23 - 31 mmol/L Final     Blood Urea Nitrogen   Date Value Ref Range Status   10/12/2023 21.8 8.4 - 25.7 mg/dL Final     Creatinine   Date Value Ref Range Status   10/12/2023 1.23 (H) 0.73 - 1.18 mg/dL Final     Calcium   Date Value Ref Range Status   10/12/2023 10.3 (H) 8.8 - 10.0 mg/dL Final     Albumin   Date Value Ref Range Status   02/15/2022 3.9 3.4 - 4.8      Bilirubin Total   Date Value Ref Range Status   02/15/2022 0.6 0.2 - 1.2      ALP   Date Value Ref Range Status   02/15/2022 102 40 - 150      AST   Date Value Ref Range Status   02/15/2022 20 5 - 34      ALT   Date Value Ref Range Status   02/15/2022 18 0 - 55      Estimated GFR-Non    Date Value Ref Range Status   02/15/2022 43       Lab Results   Component Value Date    CHOL 197 02/02/2018     Lab Results   Component Value Date    HDL 42 02/02/2018     Lab Results   Component Value Date    TRIG 111 02/02/2018     Lab Results   Component Value Date    VLDL 22 02/02/2018     Lab Results   Component Value  Date     (H) 02/02/2018     Lab Results   Component Value Date    TSH 1.0834 02/15/2022         Assessment        ICD-10-CM ICD-9-CM   1. Primary hypertension  I10 401.9   2. Influenza vaccine needed  Z23 V04.81   3. Need for prophylactic vaccination with Streptococcus pneumoniae (Pneumococcus) and Influenza vaccines  Z23 V06.6   4. 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  Monitor blood pressure daily and log. Report consistent numbers greater than 140/90.  Maintain healthy weight with goal BMI <30. Exercise 30 minutes per day, 5 days per week.  Smoking cessation encouraged to aid in BP reduction.           Relevant Medications    amLODIPine (NORVASC) 10 MG tablet       Orthopedic    Gout    Current Assessment & Plan     Continue allopurinol daily  Stay well hydrated by increasing water intake throughout the day.  Stressed importance of exercise and weight loss to maintain BMI <30.  Avoid alcohol, sodas, organ/glandular meats (liver, kidney, sweetbreads). Limit seafood and red meat intake.  Eat a balanced diet of fruits, vegetables, complex carbohydrates, and lean sources of protein (boneless/skinless chicken breasts, salmon, lentils, low fat dairy).  Discussed possible benefit of OTC Vitamin C supplementation.          Relevant Medications    allopurinoL (ZYLOPRIM) 300 MG tablet     Other Visit Diagnoses       Influenza vaccine needed        Relevant Medications    influenza (adjuvanted) (Fluad) 45 mcg/0.5 mL IM vaccine (> or = 66 yo) 0.5 mL (Completed)    Need for prophylactic vaccination with Streptococcus pneumoniae (Pneumococcus) and Influenza vaccines        Relevant Medications    pneumoc 20-sonia  conj-dip cr(PF) (PREVNAR-20 (PF)) injection Syrg 0.5 mL (Completed)            Future Appointments   Date Time Provider Department Center   10/21/2024  2:20 PM Jonelle Lin, GABY [facility]        Follow up in about 2 weeks (around 10/17/2024) for HTN, With labwork prior to visit.      Signature:      [facility]  [address] [facility] [address]    Date of encounter: 10/3/24

## 2024-10-03 ENCOUNTER — OFFICE VISIT (OUTPATIENT)
Dept: INTERNAL MEDICINE | Facility: CLINIC | Age: 84
End: 2024-10-03
Payer: MEDICARE

## 2024-10-03 ENCOUNTER — LAB VISIT (OUTPATIENT)
Dept: LAB | Facility: HOSPITAL | Age: 84
End: 2024-10-03
Attending: NURSE PRACTITIONER
Payer: MEDICARE

## 2024-10-03 VITALS
OXYGEN SATURATION: 97 % | DIASTOLIC BLOOD PRESSURE: 77 MMHG | WEIGHT: 195.38 LBS | HEART RATE: 82 BPM | SYSTOLIC BLOOD PRESSURE: 147 MMHG | HEIGHT: 71 IN | TEMPERATURE: 98 F | RESPIRATION RATE: 16 BRPM | BODY MASS INDEX: 27.35 KG/M2

## 2024-10-03 DIAGNOSIS — I10 PRIMARY HYPERTENSION: Primary | Chronic | ICD-10-CM

## 2024-10-03 DIAGNOSIS — M1A.0711 IDIOPATHIC CHRONIC GOUT OF RIGHT FOOT WITH TOPHUS: ICD-10-CM

## 2024-10-03 DIAGNOSIS — I10 PRIMARY HYPERTENSION: ICD-10-CM

## 2024-10-03 DIAGNOSIS — Z23 NEED FOR PROPHYLACTIC VACCINATION WITH STREPTOCOCCUS PNEUMONIAE (PNEUMOCOCCUS) AND INFLUENZA VACCINES: ICD-10-CM

## 2024-10-03 DIAGNOSIS — Z23 INFLUENZA VACCINE NEEDED: ICD-10-CM

## 2024-10-03 DIAGNOSIS — D64.9 ANEMIA, UNSPECIFIED TYPE: Primary | ICD-10-CM

## 2024-10-03 DIAGNOSIS — D64.9 ANEMIA, UNSPECIFIED TYPE: ICD-10-CM

## 2024-10-03 DIAGNOSIS — I25.10 CORONARY ARTERY DISEASE INVOLVING NATIVE CORONARY ARTERY OF NATIVE HEART WITHOUT ANGINA PECTORIS: ICD-10-CM

## 2024-10-03 PROBLEM — Z76.89 ENCOUNTER TO ESTABLISH CARE: Status: RESOLVED | Noted: 2024-05-15 | Resolved: 2024-10-03

## 2024-10-03 LAB
ALBUMIN SERPL-MCNC: 4.1 G/DL (ref 3.4–4.8)
ALBUMIN/GLOB SERPL: 1 RATIO (ref 1.1–2)
ALP SERPL-CCNC: 132 UNIT/L (ref 40–150)
ALT SERPL-CCNC: 11 UNIT/L (ref 0–55)
ANION GAP SERPL CALC-SCNC: 10 MEQ/L
ANISOCYTOSIS BLD QL SMEAR: ABNORMAL
AST SERPL-CCNC: 18 UNIT/L (ref 5–34)
BACTERIA #/AREA URNS AUTO: ABNORMAL /HPF
BASOPHILS # BLD AUTO: 0.05 X10(3)/MCL
BASOPHILS NFR BLD AUTO: 0.6 %
BILIRUB SERPL-MCNC: 0.8 MG/DL
BILIRUB UR QL STRIP.AUTO: NEGATIVE
BUN SERPL-MCNC: 28.7 MG/DL (ref 8.4–25.7)
CALCIUM SERPL-MCNC: 10.8 MG/DL (ref 8.8–10)
CHLORIDE SERPL-SCNC: 109 MMOL/L (ref 98–107)
CHOLEST SERPL-MCNC: 107 MG/DL
CHOLEST/HDLC SERPL: 2 {RATIO} (ref 0–5)
CLARITY UR: CLEAR
CO2 SERPL-SCNC: 24 MMOL/L (ref 23–31)
COLOR UR AUTO: ABNORMAL
CREAT SERPL-MCNC: 1.64 MG/DL (ref 0.73–1.18)
CREAT UR-MCNC: 46.3 MG/DL (ref 63–166)
CREAT/UREA NIT SERPL: 18
EOSINOPHIL # BLD AUTO: 1.28 X10(3)/MCL (ref 0–0.9)
EOSINOPHIL NFR BLD AUTO: 15.3 %
ERYTHROCYTE [DISTWIDTH] IN BLOOD BY AUTOMATED COUNT: 24.7 % (ref 11.5–17)
FERRITIN SERPL-MCNC: 445.67 NG/ML (ref 21.81–274.66)
GFR SERPLBLD CREATININE-BSD FMLA CKD-EPI: 41 ML/MIN/1.73/M2
GLOBULIN SER-MCNC: 4.3 GM/DL (ref 2.4–3.5)
GLUCOSE SERPL-MCNC: 106 MG/DL (ref 82–115)
GLUCOSE UR QL STRIP: NORMAL
HCT VFR BLD AUTO: 32.5 % (ref 42–52)
HDLC SERPL-MCNC: 43 MG/DL (ref 35–60)
HGB BLD-MCNC: 10.1 G/DL (ref 14–18)
HGB UR QL STRIP: NEGATIVE
HYALINE CASTS #/AREA URNS LPF: ABNORMAL /LPF
HYPOCHROMIA BLD QL SMEAR: ABNORMAL
IMM GRANULOCYTES # BLD AUTO: 0.04 X10(3)/MCL (ref 0–0.04)
IMM GRANULOCYTES NFR BLD AUTO: 0.5 %
IRON SATN MFR SERPL: 40 % (ref 20–50)
IRON SERPL-MCNC: 106 UG/DL (ref 65–175)
KETONES UR QL STRIP: NEGATIVE
LDLC SERPL CALC-MCNC: 53 MG/DL (ref 50–140)
LEUKOCYTE ESTERASE UR QL STRIP: NEGATIVE
LYMPHOCYTES # BLD AUTO: 1.47 X10(3)/MCL (ref 0.6–4.6)
LYMPHOCYTES NFR BLD AUTO: 17.6 %
MCH RBC QN AUTO: 22.6 PG (ref 27–31)
MCHC RBC AUTO-ENTMCNC: 31.1 G/DL (ref 33–36)
MCV RBC AUTO: 72.7 FL (ref 80–94)
MICROALBUMIN UR-MCNC: 18.4 UG/ML
MICROALBUMIN/CREAT RATIO PNL UR: 39.7 MG/GM CR (ref 0–30)
MONOCYTES # BLD AUTO: 0.62 X10(3)/MCL (ref 0.1–1.3)
MONOCYTES NFR BLD AUTO: 7.4 %
MUCOUS THREADS URNS QL MICRO: ABNORMAL /LPF
NEUTROPHILS # BLD AUTO: 4.91 X10(3)/MCL (ref 2.1–9.2)
NEUTROPHILS NFR BLD AUTO: 58.6 %
NITRITE UR QL STRIP: NEGATIVE
NRBC BLD AUTO-RTO: 0.8 %
OVALOCYTES (OLG): ABNORMAL
PH UR STRIP: 5.5 [PH]
PLATELET # BLD AUTO: 152 X10(3)/MCL (ref 130–400)
PLATELET # BLD EST: ADEQUATE 10*3/UL
PLATELETS.RETICULATED NFR BLD AUTO: 6.6 % (ref 0.9–11.2)
PMV BLD AUTO: ABNORMAL FL
POTASSIUM SERPL-SCNC: 4.3 MMOL/L (ref 3.5–5.1)
PROT SERPL-MCNC: 8.4 GM/DL (ref 5.8–7.6)
PROT UR QL STRIP: NEGATIVE
RBC # BLD AUTO: 4.47 X10(6)/MCL (ref 4.7–6.1)
RBC #/AREA URNS AUTO: ABNORMAL /HPF
RBC MORPH BLD: ABNORMAL
RET# (OHS): 0.06 X10E6/UL (ref 0.03–0.1)
RETICULOCYTE COUNT AUTOMATED (OLG): 1.39 % (ref 1.1–2.1)
SODIUM SERPL-SCNC: 143 MMOL/L (ref 136–145)
SP GR UR STRIP.AUTO: 1.01 (ref 1–1.03)
SQUAMOUS #/AREA URNS LPF: ABNORMAL /HPF
TIBC SERPL-MCNC: 157 UG/DL (ref 69–240)
TIBC SERPL-MCNC: 263 UG/DL (ref 250–450)
TRANSFERRIN SERPL-MCNC: 224 MG/DL
TRIGL SERPL-MCNC: 56 MG/DL (ref 34–140)
TSH SERPL-ACNC: 2.38 UIU/ML (ref 0.35–4.94)
UROBILINOGEN UR STRIP-ACNC: NORMAL
VLDLC SERPL CALC-MCNC: 11 MG/DL
WBC # BLD AUTO: 8.37 X10(3)/MCL (ref 4.5–11.5)
WBC #/AREA URNS AUTO: ABNORMAL /HPF

## 2024-10-03 PROCEDURE — 90677 PCV20 VACCINE IM: CPT | Mod: PBBFAC

## 2024-10-03 PROCEDURE — 82570 ASSAY OF URINE CREATININE: CPT

## 2024-10-03 PROCEDURE — 81001 URINALYSIS AUTO W/SCOPE: CPT

## 2024-10-03 PROCEDURE — 80053 COMPREHEN METABOLIC PANEL: CPT

## 2024-10-03 PROCEDURE — 82728 ASSAY OF FERRITIN: CPT

## 2024-10-03 PROCEDURE — 83540 ASSAY OF IRON: CPT

## 2024-10-03 PROCEDURE — 85045 AUTOMATED RETICULOCYTE COUNT: CPT

## 2024-10-03 PROCEDURE — 83550 IRON BINDING TEST: CPT

## 2024-10-03 PROCEDURE — 80061 LIPID PANEL: CPT

## 2024-10-03 PROCEDURE — 36415 COLL VENOUS BLD VENIPUNCTURE: CPT

## 2024-10-03 PROCEDURE — G0008 ADMIN INFLUENZA VIRUS VAC: HCPCS | Mod: PBBFAC

## 2024-10-03 PROCEDURE — 85025 COMPLETE CBC W/AUTO DIFF WBC: CPT

## 2024-10-03 PROCEDURE — G0009 ADMIN PNEUMOCOCCAL VACCINE: HCPCS | Mod: PBBFAC

## 2024-10-03 PROCEDURE — 84443 ASSAY THYROID STIM HORMONE: CPT

## 2024-10-03 PROCEDURE — 99215 OFFICE O/P EST HI 40 MIN: CPT | Mod: PBBFAC,25

## 2024-10-03 PROCEDURE — 90653 IIV ADJUVANT VACCINE IM: CPT | Mod: PBBFAC

## 2024-10-03 RX ORDER — AMLODIPINE BESYLATE 10 MG/1
10 TABLET ORAL DAILY
Qty: 90 TABLET | Refills: 2 | Status: SHIPPED | OUTPATIENT
Start: 2024-10-03 | End: 2025-06-30

## 2024-10-03 RX ORDER — ALLOPURINOL 300 MG/1
300 TABLET ORAL DAILY
Qty: 90 TABLET | Refills: 2 | Status: SHIPPED | OUTPATIENT
Start: 2024-10-03 | End: 2025-06-30

## 2024-10-03 RX ADMIN — INFLUENZA A VIRUS A/VICTORIA/4897/2022 IVR-238 (H1N1) ANTIGEN (FORMALDEHYDE INACTIVATED), INFLUENZA A VIRUS A/THAILAND/8/2022 IVR-237 (H3N2) ANTIGEN (FORMALDEHYDE INACTIVATED), INFLUENZA B VIRUS B/AUSTRIA/1359417/2021 BVR-26 ANTIGEN (FORMALDEHYDE INACTIVATED) 0.5 ML: 15; 15; 15 INJECTION, SUSPENSION INTRAMUSCULAR at 12:10

## 2024-10-03 RX ADMIN — PNEUMOCOCCAL 20-VALENT CONJUGATE VACCINE 0.5 ML
2.2; 2.2; 2.2; 2.2; 2.2; 2.2; 2.2; 2.2; 2.2; 2.2; 2.2; 2.2; 2.2; 2.2; 2.2; 2.2; 4.4; 2.2; 2.2; 2.2 INJECTION, SUSPENSION INTRAMUSCULAR at 12:10

## 2024-10-03 NOTE — ASSESSMENT & PLAN NOTE
Continue allopurinol daily  Stay well hydrated by increasing water intake throughout the day.  Stressed importance of exercise and weight loss to maintain BMI <30.  Avoid alcohol, sodas, organ/glandular meats (liver, kidney, sweetbreads). Limit seafood and red meat intake.  Eat a balanced diet of fruits, vegetables, complex carbohydrates, and lean sources of protein (boneless/skinless chicken breasts, salmon, lentils, low fat dairy).  Discussed possible benefit of OTC Vitamin C supplementation.

## 2024-10-03 NOTE — ASSESSMENT & PLAN NOTE
Borderline, continue meds and RTC 2 weeks with BP log  Low Sodium Diet (DASH Diet - Less than 2 grams of sodium per day).  Monitor blood pressure daily and log. Report consistent numbers greater than 140/90.  Maintain healthy weight with goal BMI <30. Exercise 30 minutes per day, 5 days per week.  Smoking cessation encouraged to aid in BP reduction.

## 2024-10-09 RX ORDER — LEVOCETIRIZINE DIHYDROCHLORIDE 5 MG/1
5 TABLET, FILM COATED ORAL NIGHTLY
Qty: 90 TABLET | Refills: 3 | Status: SHIPPED | OUTPATIENT
Start: 2024-10-09 | End: 2025-10-09

## 2024-10-14 RX ORDER — ATORVASTATIN CALCIUM 40 MG/1
40 TABLET, FILM COATED ORAL NIGHTLY
Qty: 30 TABLET | Refills: 0 | Status: SHIPPED | OUTPATIENT
Start: 2024-10-14

## 2024-10-14 NOTE — TELEPHONE ENCOUNTER
----- Message from Carolina sent at 10/14/2024  8:20 AM CDT -----  Who Called: Keenan Ugarte    Pharmacy is calling to request assistance with Rx    Pharmacy name and phone number: [facility] [address] [place] [address]   Phone: [phone]  Fax: [phone]        Pharmacy contact: ellie  Patient Name: keenan  Prescription Name: atorvastatin (LIPITOR) 40 MG tablet   What do they need to clarify?:refill request        Preferred Method of Contact: Phone Call  Patient's Preferred Phone Number on File: [phone]   Best Call Back Number, if different:  Additional Information: pharmacy call, please advise, thanks

## 2024-10-21 ENCOUNTER — OFFICE VISIT (OUTPATIENT)
Dept: INTERNAL MEDICINE | Facility: CLINIC | Age: 84
End: 2024-10-21
Payer: MEDICARE

## 2024-10-21 VITALS
TEMPERATURE: 98 F | DIASTOLIC BLOOD PRESSURE: 71 MMHG | HEIGHT: 71 IN | BODY MASS INDEX: 28.04 KG/M2 | RESPIRATION RATE: 16 BRPM | SYSTOLIC BLOOD PRESSURE: 128 MMHG | WEIGHT: 200.31 LBS | OXYGEN SATURATION: 94 % | HEART RATE: 70 BPM

## 2024-10-21 DIAGNOSIS — N18.32 STAGE 3B CHRONIC KIDNEY DISEASE: ICD-10-CM

## 2024-10-21 DIAGNOSIS — I10 PRIMARY HYPERTENSION: Primary | Chronic | ICD-10-CM

## 2024-10-21 DIAGNOSIS — E78.2 MIXED HYPERLIPIDEMIA: ICD-10-CM

## 2024-10-21 PROBLEM — N18.31 STAGE 3A CHRONIC KIDNEY DISEASE: Status: ACTIVE | Noted: 2024-10-21

## 2024-10-21 PROCEDURE — 1159F MED LIST DOCD IN RCRD: CPT | Mod: CPTII,,,

## 2024-10-21 PROCEDURE — 99215 OFFICE O/P EST HI 40 MIN: CPT | Mod: PBBFAC

## 2024-10-21 PROCEDURE — 1160F RVW MEDS BY RX/DR IN RCRD: CPT | Mod: CPTII,,,

## 2024-10-21 PROCEDURE — 1126F AMNT PAIN NOTED NONE PRSNT: CPT | Mod: CPTII,,,

## 2024-10-21 PROCEDURE — 99214 OFFICE O/P EST MOD 30 MIN: CPT | Mod: S$PBB,,,

## 2024-10-21 PROCEDURE — 3074F SYST BP LT 130 MM HG: CPT | Mod: CPTII,,,

## 2024-10-21 PROCEDURE — 3078F DIAST BP <80 MM HG: CPT | Mod: CPTII,,,

## 2024-10-21 RX ORDER — LOSARTAN POTASSIUM 100 MG/1
100 TABLET ORAL DAILY
Qty: 90 TABLET | Refills: 1 | Status: SHIPPED | OUTPATIENT
Start: 2024-10-21

## 2024-10-21 RX ORDER — ISOSORBIDE MONONITRATE 60 MG/1
60 TABLET, EXTENDED RELEASE ORAL DAILY
Qty: 90 TABLET | Refills: 1 | Status: SHIPPED | OUTPATIENT
Start: 2024-10-21

## 2024-10-21 RX ORDER — ATORVASTATIN CALCIUM 40 MG/1
40 TABLET, FILM COATED ORAL NIGHTLY
Qty: 90 TABLET | Refills: 2 | Status: SHIPPED | OUTPATIENT
Start: 2024-10-21

## 2024-10-21 NOTE — ASSESSMENT & PLAN NOTE
At goal  Cotninue amlodipine and imdur and losartan  Low Sodium Diet (DASH Diet - Less than 2 grams of sodium per day).  Monitor blood pressure daily and log. Report consistent numbers greater than 140/90.  Maintain healthy weight with goal BMI <30. Exercise 30 minutes per day, 5 days per week.  Smoking cessation encouraged to aid in BP reduction.

## 2024-10-21 NOTE — PROGRESS NOTES
GABY Burns   [facility]  [address] [facility] [address]      PATIENT NAME: Hung Ugarte  : 1940  DATE: 10/21/24  MRN: [id number]      Patient PCP Information       Provider PCP Type    GABY Burns [facility]            Reason for Visit / Chief Complaint: Follow-up (Lab review)       History of Present Illness / Problem Focused Workflow     Hung Ugarte presents to the clinic with Follow-up (Lab review)     83 y.o.  male presents to the clinic. Medical problems include HTN, CAD, PAD (stents BLE), A. Fib, pacemaker, gout, DDD lumbosacral, LBP w/sciatica, insomnia, and hx tobacco abuse (quit about 20 yrs ago). Followed by Dr. Yao Daniels, cardiology. Pt unable to read or write.    10/3/24  Pt presents for routine follow up and lab results, he did not complete labs. Blood pressure is above goal today, he reports compliance with medications.  He does not have a blood pressure cuff at home, agreeable to buying one and keeping blood pressure log and return to clinic in 2 weeks.  Patient also due for blood work, we will complete today due to transportation limitation although he is not fasting.  Denies any gout flare-ups in the last year, compliant with allopurinol.  Compliant with cardiology visits.  Agreeable to flu vaccine and Pneumovax    10/21/24  Pt presents for HTN follow up and lab review. BP at goal today, no log in clinic. Compliant with medications. Labs reviewed with pt, significant for continued decline in renal function. Encouraged pt on renal protective measures and will refer to nephrology. Denies acute complaints           Review of Systems     Review of Systems   Constitutional:  Negative for fatigue, fever and unexpected weight change.   HENT:  Negative for ear pain, hearing loss, trouble swallowing and voice change.    Respiratory:  Negative for cough and shortness of breath.   "  Cardiovascular:  Negative for chest pain and palpitations.   Gastrointestinal:  Negative for abdominal pain, diarrhea and vomiting.   Genitourinary:  Negative for dysuria.   Musculoskeletal:  Negative for gait problem.   Skin:  Negative for rash and wound.   Neurological:  Negative for weakness.   Psychiatric/Behavioral:  Negative for suicidal ideas.          Medications and Allergies     Medications  Current Outpatient Medications   Medication Instructions    allopurinoL (ZYLOPRIM) 300 mg, Oral, Daily    amLODIPine (NORVASC) 10 mg, Oral, Daily    atorvastatin (LIPITOR) 40 mg, Oral, Nightly    carvediloL (COREG) 3.125 mg, 2 times daily    diclofenac sodium (VOLTAREN) 2 g, Topical (Top), 3 times daily PRN    ELIQUIS 5 mg, 2 times daily    furosemide (LASIX) 20 mg    HYDROcodone-acetaminophen (NORCO) 5-325 mg per tablet 1 tablet, Oral, Every 6 hours PRN    isosorbide mononitrate (IMDUR) 30 mg    isosorbide mononitrate (IMDUR) 60 mg, Oral, Daily    levocetirizine (XYZAL) 5 mg, Oral, Nightly    losartan (COZAAR) 100 mg, Oral, Daily    traZODone (DESYREL) 50 mg, Oral, Nightly         Allergies  Review of patient's allergies indicates:  No Known Allergies    Physical Examination     Visit Vitals  /71 (BP Location: Left arm, Patient Position: Sitting)   Pulse 70   Temp 97.7 °F (36.5 °C) (Oral)   Resp 16   Ht 5' 11" (1.803 m)   Wt 90.9 kg (200 lb 4.8 oz)   SpO2 (!) 94%   BMI 27.94 kg/m²       Physical Exam  Vitals and nursing note reviewed.   Constitutional:       General: He is not in acute distress.     Appearance: He is not ill-appearing.   HENT:      Head: Normocephalic and atraumatic.      Mouth/Throat:      Mouth: Mucous membranes are moist.      Pharynx: Oropharynx is clear.   Eyes:      General: No scleral icterus.     Extraocular Movements: Extraocular movements intact.      Conjunctiva/sclera: Conjunctivae normal.      Pupils: Pupils are equal, round, and reactive to light.   Neck:      Vascular: No " carotid bruit.   Cardiovascular:      Rate and Rhythm: Normal rate and regular rhythm.      Heart sounds: No murmur heard.     No friction rub. No gallop.   Pulmonary:      Effort: Pulmonary effort is normal. No respiratory distress.      Breath sounds: Normal breath sounds. No wheezing, rhonchi or rales.   Abdominal:      General: Abdomen is flat. Bowel sounds are normal. There is no distension.      Palpations: Abdomen is soft. There is no mass.      Tenderness: There is no abdominal tenderness.   Musculoskeletal:         General: Normal range of motion.      Cervical back: Normal range of motion and neck supple.   Skin:     General: Skin is warm and dry.   Neurological:      General: No focal deficit present.      Mental Status: He is alert.   Psychiatric:         Mood and Affect: Mood normal.           Results     Lab Results   Component Value Date    WBC 8.37 10/03/2024    RBC 4.47 (L) 10/03/2024    HGB 10.1 (L) 10/03/2024    HCT 32.5 (L) 10/03/2024    MCV 72.7 (L) 10/03/2024    MCH 22.6 (L) 10/03/2024    MCHC 31.1 (L) 10/03/2024    RDW 24.7 (H) 10/03/2024     10/03/2024    MPV  10/03/2024      Comment:      N/A     CMP  Sodium   Date Value Ref Range Status   10/03/2024 143 136 - 145 mmol/L Final     Potassium   Date Value Ref Range Status   10/03/2024 4.3 3.5 - 5.1 mmol/L Final     Chloride   Date Value Ref Range Status   10/03/2024 109 (H) 98 - 107 mmol/L Final     CO2   Date Value Ref Range Status   10/03/2024 24 23 - 31 mmol/L Final     Blood Urea Nitrogen   Date Value Ref Range Status   10/03/2024 28.7 (H) 8.4 - 25.7 mg/dL Final     Creatinine   Date Value Ref Range Status   10/03/2024 1.64 (H) 0.73 - 1.18 mg/dL Final     Calcium   Date Value Ref Range Status   10/03/2024 10.8 (H) 8.8 - 10.0 mg/dL Final     Albumin   Date Value Ref Range Status   10/03/2024 4.1 3.4 - 4.8 g/dL Final     Bilirubin Total   Date Value Ref Range Status   10/03/2024 0.8 <=1.5 mg/dL Final     ALP   Date Value Ref Range  Status   10/03/2024 132 40 - 150 unit/L Final     AST   Date Value Ref Range Status   10/03/2024 18 5 - 34 unit/L Final     ALT   Date Value Ref Range Status   10/03/2024 11 0 - 55 unit/L Final     Estimated GFR-Non    Date Value Ref Range Status   02/15/2022 43       Lab Results   Component Value Date    CHOL 107 10/03/2024     Lab Results   Component Value Date    HDL 43 10/03/2024     Lab Results   Component Value Date    TRIG 56 10/03/2024     Lab Results   Component Value Date    VLDL 11 10/03/2024     Lab Results   Component Value Date    LDL 53.00 10/03/2024     Lab Results   Component Value Date    TSH 2.380 10/03/2024         Assessment        ICD-10-CM ICD-9-CM   1. Primary hypertension  I10 401.9   2. Stage 3b chronic kidney disease  N18.32 585.3   3. Mixed hyperlipidemia  E78.2 272.2        Plan      Problem List Items Addressed This Visit       Primary hypertension - Primary (Chronic)    Current Assessment & Plan     At goal  Cotninue amlodipine and imdur and losartan  Low Sodium Diet (DASH Diet - Less than 2 grams of sodium per day).  Monitor blood pressure daily and log. Report consistent numbers greater than 140/90.  Maintain healthy weight with goal BMI <30. Exercise 30 minutes per day, 5 days per week.  Smoking cessation encouraged to aid in BP reduction.           Relevant Medications    isosorbide mononitrate (IMDUR) 60 MG 24 hr tablet    losartan (COZAAR) 100 MG tablet    Stage 3b chronic kidney disease    Current Assessment & Plan     Lab Results   Component Value Date    EGFRNORACEVR 41 10/03/2024    EGFRNORACEVR 59 10/12/2023   Referral to nephrology today  Follow renoprotective measures including Renal Diet (reduce intake of nuts, peanut butter, milk, cheese, dried beans, peas) and Low Sodium Diet (less than 2 grams per day).  Avoid NSAIDs (Aleve, Mobic, Celebrex, Ibuprofen, Advil, Toradol and Diclofenac). May take Tylenol as needed for headache/pain.  Stay well hydrated.  Avoid alcohol and soda. Limit tea and coffee.           Relevant Orders    Ambulatory referral/consult to Nephrology     Other Visit Diagnoses       Mixed hyperlipidemia        Relevant Medications    atorvastatin (LIPITOR) 40 MG tablet            Future Appointments   Date Time Provider Department Center   1/21/2025  2:00 PM Jonelle Lin, RAMINP [facility]        Follow up in about 3 months (around 1/21/2025) for HTN.      Signature:      [facility]  [address] [facility] [id number]    Date of encounter: 10/21/24

## 2024-10-30 RX ORDER — TRAZODONE HYDROCHLORIDE 50 MG/1
50 TABLET ORAL NIGHTLY
Qty: 30 TABLET | Refills: 3 | Status: SHIPPED | OUTPATIENT
Start: 2024-10-30

## 2025-02-05 ENCOUNTER — OFFICE VISIT (OUTPATIENT)
Dept: INTERNAL MEDICINE | Facility: CLINIC | Age: 85
End: 2025-02-05
Payer: MEDICARE

## 2025-02-05 ENCOUNTER — TELEPHONE (OUTPATIENT)
Dept: INTERNAL MEDICINE | Facility: CLINIC | Age: 85
End: 2025-02-05
Payer: MEDICARE

## 2025-02-05 VITALS
OXYGEN SATURATION: 99 % | SYSTOLIC BLOOD PRESSURE: 138 MMHG | BODY MASS INDEX: 28.61 KG/M2 | HEART RATE: 88 BPM | HEIGHT: 71 IN | DIASTOLIC BLOOD PRESSURE: 78 MMHG | WEIGHT: 204.38 LBS | RESPIRATION RATE: 18 BRPM

## 2025-02-05 DIAGNOSIS — N18.32 STAGE 3B CHRONIC KIDNEY DISEASE: ICD-10-CM

## 2025-02-05 DIAGNOSIS — I10 PRIMARY HYPERTENSION: Primary | Chronic | ICD-10-CM

## 2025-02-05 DIAGNOSIS — D64.9 ANEMIA, UNSPECIFIED TYPE: ICD-10-CM

## 2025-02-05 PROCEDURE — 1160F RVW MEDS BY RX/DR IN RCRD: CPT | Mod: CPTII,,,

## 2025-02-05 PROCEDURE — 3075F SYST BP GE 130 - 139MM HG: CPT | Mod: CPTII,,,

## 2025-02-05 PROCEDURE — 1101F PT FALLS ASSESS-DOCD LE1/YR: CPT | Mod: CPTII,,,

## 2025-02-05 PROCEDURE — 1159F MED LIST DOCD IN RCRD: CPT | Mod: CPTII,,,

## 2025-02-05 PROCEDURE — 99214 OFFICE O/P EST MOD 30 MIN: CPT | Mod: PBBFAC

## 2025-02-05 PROCEDURE — 3288F FALL RISK ASSESSMENT DOCD: CPT | Mod: CPTII,,,

## 2025-02-05 PROCEDURE — 99214 OFFICE O/P EST MOD 30 MIN: CPT | Mod: S$PBB,,,

## 2025-02-05 PROCEDURE — 3078F DIAST BP <80 MM HG: CPT | Mod: CPTII,,,

## 2025-02-05 NOTE — ASSESSMENT & PLAN NOTE
At goal  Cotninue amlodipine and imdur and losartan  Low Sodium Diet (DASH Diet - Less than 2 grams of sodium per day).  Monitor blood pressure daily and log. Report consistent numbers greater than 140/90.  Maintain healthy weight with goal BMI <30. Exercise 30 minutes per day, 5 days per week.

## 2025-02-05 NOTE — PROGRESS NOTES
GABY Burns   [facility]  [address] [facility] [address]      PATIENT NAME: Hung Ugarte  : 1940  DATE: 25  MRN: [id number]      Patient PCP Information       Provider PCP Type    G[facility]Y Burns [facility]            Reason for Visit / Chief Complaint: Hypertension (Pt stated he is here for 3m HTN f/u)       History of Present Illness / Problem Focused Workflow     Hung Ugarte presents to the clinic with Hypertension (Pt stated he is here for 3m HTN f/u)     83 y.o.  male presents to the clinic. Medical problems include HTN, CAD, PAD (stents BLE), A. Fib, pacemaker, gout, DDD lumbosacral, LBP w/sciatica, insomnia, and hx tobacco abuse (quit about 20 yrs ago). Followed by Dr. Yao Daniels, cardiology. Pt unable to read or write.    10/3/24  Pt presents for routine follow up and lab results, he did not complete labs. Blood pressure is above goal today, he reports compliance with medications.  He does not have a blood pressure cuff at home, agreeable to buying one and keeping blood pressure log and return to clinic in 2 weeks.  Patient also due for blood work, we will complete today due to transportation limitation although he is not fasting.  Denies any gout flare-ups in the last year, compliant with allopurinol.  Compliant with cardiology visits.  Agreeable to flu vaccine and Pneumovax    10/21/24  Pt presents for HTN follow up and lab review. BP at goal today, no log in clinic. Compliant with medications. Labs reviewed with pt, significant for continued decline in renal function. Encouraged pt on renal protective measures and will refer to nephrology. Denies acute complaints     25  Pt presents for HTN follow up. Blood pressure is at goal. He is reporting for the last few months he is experiencing shortness of breath, he is established with Dr Daniels at [facility] and has had stents placed in  the past. He is unsure when his next apt is, called [facility] and informed them of his complaint- a provider from [facility] will call him today and assess need for sooner apt/work up. Pt aware he will be expecting a call. Provided number for him to call and schedule nephrology apt. Asking if he can try omega xl for back pain- ok to try. RTC in 4 months for HTN, CKD, anemia follow up.           Review of Systems     Review of Systems   Constitutional:  Negative for fatigue, fever and unexpected weight change.   HENT:  Negative for ear pain, hearing loss, trouble swallowing and voice change.    Respiratory:  Positive for shortness of breath. Negative for cough.    Cardiovascular:  Negative for chest pain and palpitations.   Gastrointestinal:  Negative for abdominal pain, diarrhea and vomiting.   Genitourinary:  Negative for dysuria.   Musculoskeletal:  Negative for gait problem.   Skin:  Negative for rash and wound.   Neurological:  Negative for weakness.   Psychiatric/Behavioral:  Negative for suicidal ideas.          Medications and Allergies     Medications  Current Outpatient Medications   Medication Instructions    allopurinoL (ZYLOPRIM) 300 mg, Oral, Daily    amLODIPine (NORVASC) 10 mg, Oral, Daily    atorvastatin (LIPITOR) 40 mg, Oral, Nightly    carvediloL (COREG) 3.125 mg, 2 times daily    diclofenac sodium (VOLTAREN) 2 g, Topical (Top), 3 times daily PRN    ELIQUIS 5 mg, 2 times daily    furosemide (LASIX) 20 mg    HYDROcodone-acetaminophen (NORCO) 5-325 mg per tablet 1 tablet, Oral, Every 6 hours PRN    isosorbide mononitrate (IMDUR) 30 mg    isosorbide mononitrate (IMDUR) 60 mg, Oral, Daily    levocetirizine (XYZAL) 5 mg, Oral, Nightly    losartan (COZAAR) 100 mg, Oral, Daily    traZODone (DESYREL) 50 mg, Oral, Nightly         Allergies  Review of patient's allergies indicates:  No Known Allergies    Physical Examination     Visit Vitals  /78 (BP Location: Right arm, Patient Position: Sitting)   Pulse 88   Resp  "18   Ht 5' 11" (1.803 m)   Wt 92.7 kg (204 lb 6.4 oz)   SpO2 99%   BMI 28.51 kg/m²       Physical Exam  Vitals and nursing note reviewed.   Constitutional:       General: He is not in acute distress.     Appearance: He is not ill-appearing.   HENT:      Head: Normocephalic and atraumatic.      Mouth/Throat:      Mouth: Mucous membranes are moist.      Pharynx: Oropharynx is clear.   Eyes:      General: No scleral icterus.     Extraocular Movements: Extraocular movements intact.      Conjunctiva/sclera: Conjunctivae normal.      Pupils: Pupils are equal, round, and reactive to light.   Neck:      Vascular: No carotid bruit.   Cardiovascular:      Rate and Rhythm: Normal rate and regular rhythm.      Heart sounds: No murmur heard.     No friction rub. No gallop.   Pulmonary:      Effort: Pulmonary effort is normal. No respiratory distress.      Breath sounds: Normal breath sounds. No wheezing, rhonchi or rales.   Abdominal:      General: Abdomen is flat. Bowel sounds are normal. There is no distension.      Palpations: Abdomen is soft. There is no mass.      Tenderness: There is no abdominal tenderness.   Musculoskeletal:         General: Normal range of motion.      Cervical back: Normal range of motion and neck supple.   Skin:     General: Skin is warm and dry.   Neurological:      General: No focal deficit present.      Mental Status: He is alert.   Psychiatric:         Mood and Affect: Mood normal.           Results     Lab Results   Component Value Date    WBC 8.37 10/03/2024    RBC 4.47 (L) 10/03/2024    HGB 10.1 (L) 10/03/2024    HCT 32.5 (L) 10/03/2024    MCV 72.7 (L) 10/03/2024    MCH 22.6 (L) 10/03/2024    MCHC 31.1 (L) 10/03/2024    RDW 24.7 (H) 10/03/2024     10/03/2024    MPV  10/03/2024      Comment:      N/A     CMP  Sodium   Date Value Ref Range Status   10/03/2024 143 136 - 145 mmol/L Final     Potassium   Date Value Ref Range Status   10/03/2024 4.3 3.5 - 5.1 mmol/L Final     Chloride   Date " Value Ref Range Status   10/03/2024 109 (H) 98 - 107 mmol/L Final     CO2   Date Value Ref Range Status   10/03/2024 24 23 - 31 mmol/L Final     Blood Urea Nitrogen   Date Value Ref Range Status   10/03/2024 28.7 (H) 8.4 - 25.7 mg/dL Final     Creatinine   Date Value Ref Range Status   10/03/2024 1.64 (H) 0.73 - 1.18 mg/dL Final     Calcium   Date Value Ref Range Status   10/03/2024 10.8 (H) 8.8 - 10.0 mg/dL Final     Albumin   Date Value Ref Range Status   10/03/2024 4.1 3.4 - 4.8 g/dL Final     Bilirubin Total   Date Value Ref Range Status   10/03/2024 0.8 <=1.5 mg/dL Final     ALP   Date Value Ref Range Status   10/03/2024 132 40 - 150 unit/L Final     AST   Date Value Ref Range Status   10/03/2024 18 5 - 34 unit/L Final     ALT   Date Value Ref Range Status   10/03/2024 11 0 - 55 unit/L Final     Estimated GFR-Non    Date Value Ref Range Status   02/15/2022 43       Lab Results   Component Value Date    CHOL 107 10/03/2024     Lab Results   Component Value Date    HDL 43 10/03/2024     Lab Results   Component Value Date    TRIG 56 10/03/2024     Lab Results   Component Value Date    VLDL 11 10/03/2024     Lab Results   Component Value Date    LDL 53.00 10/03/2024     Lab Results   Component Value Date    TSH 2.380 10/03/2024         Assessment        ICD-10-CM ICD-9-CM   1. Primary hypertension  I10 401.9   2. Stage 3b chronic kidney disease  N18.32 585.3   3. Anemia, unspecified type  D64.9 285.9        Plan      Problem List Items Addressed This Visit       Primary hypertension - Primary (Chronic)    Current Assessment & Plan     At goal  Cotninue amlodipine and imdur and losartan  Low Sodium Diet (DASH Diet - Less than 2 grams of sodium per day).  Monitor blood pressure daily and log. Report consistent numbers greater than 140/90.  Maintain healthy weight with goal BMI <30. Exercise 30 minutes per day, 5 days per week.           Relevant Orders    Comprehensive Metabolic Panel    Stage 3b  chronic kidney disease    Relevant Orders    Comprehensive Metabolic Panel     Other Visit Diagnoses       Anemia, unspecified type        Relevant Orders    CBC Auto Differential            Future Appointments   Date Time Provider Department Center   6/5/2025  1:00 PM Jonelle Lin, [facility]          Follow up in about 4 months (around 6/5/2025) for HTN, CKD, Anemia, With labwork prior to visit.      Signature:      [facility]  [address] [facility] [address]    Date of encounter: 2/5/25

## 2025-02-08 ENCOUNTER — HOSPITAL ENCOUNTER (OUTPATIENT)
Dept: RADIOLOGY | Facility: HOSPITAL | Age: 85
Discharge: HOME | End: 2025-02-08
Attending: INTERNAL MEDICINE
Payer: MEDICARE

## 2025-02-08 DIAGNOSIS — R06.02 SHORTNESS OF BREATH: ICD-10-CM

## 2025-02-08 PROCEDURE — 71046 X-RAY EXAM CHEST 2 VIEWS: CPT | Mod: TC

## 2025-02-18 DIAGNOSIS — N18.32 STAGE 3B CHRONIC KIDNEY DISEASE: Primary | ICD-10-CM

## 2025-02-25 ENCOUNTER — LAB VISIT (OUTPATIENT)
Dept: LAB | Facility: HOSPITAL | Age: 85
End: 2025-02-25
Payer: MEDICARE

## 2025-02-25 DIAGNOSIS — N18.32 STAGE 3B CHRONIC KIDNEY DISEASE: ICD-10-CM

## 2025-02-25 LAB
ALBUMIN SERPL-MCNC: 3.8 G/DL (ref 3.4–4.8)
ALBUMIN/GLOB SERPL: 1 RATIO (ref 1.1–2)
ALP SERPL-CCNC: 134 UNIT/L (ref 40–150)
ALT SERPL-CCNC: 14 UNIT/L (ref 0–55)
ANION GAP SERPL CALC-SCNC: 11 MEQ/L
AST SERPL-CCNC: 16 UNIT/L (ref 5–34)
BACTERIA #/AREA URNS AUTO: ABNORMAL /HPF
BASOPHILS # BLD AUTO: 0.06 X10(3)/MCL
BASOPHILS NFR BLD AUTO: 1 %
BILIRUB SERPL-MCNC: 1.8 MG/DL
BILIRUB UR QL STRIP.AUTO: NEGATIVE
BUN SERPL-MCNC: 21.8 MG/DL (ref 8.4–25.7)
CALCIUM SERPL-MCNC: 9.8 MG/DL (ref 8.8–10)
CHLORIDE SERPL-SCNC: 112 MMOL/L (ref 98–107)
CLARITY UR: CLEAR
CO2 SERPL-SCNC: 21 MMOL/L (ref 23–31)
COLOR UR AUTO: YELLOW
CREAT SERPL-MCNC: 1.31 MG/DL (ref 0.72–1.25)
CREAT UR-MCNC: 123.1 MG/DL (ref 63–166)
CREAT/UREA NIT SERPL: 17
EOSINOPHIL # BLD AUTO: 0.64 X10(3)/MCL (ref 0–0.9)
EOSINOPHIL NFR BLD AUTO: 10.2 %
ERYTHROCYTE [DISTWIDTH] IN BLOOD BY AUTOMATED COUNT: 29.1 % (ref 11.5–17)
GFR SERPLBLD CREATININE-BSD FMLA CKD-EPI: 54 ML/MIN/1.73/M2
GLOBULIN SER-MCNC: 3.8 GM/DL (ref 2.4–3.5)
GLUCOSE SERPL-MCNC: 109 MG/DL (ref 82–115)
GLUCOSE UR QL STRIP: NORMAL
HCT VFR BLD AUTO: 33.2 % (ref 42–52)
HGB BLD-MCNC: 10.1 G/DL (ref 14–18)
HGB UR QL STRIP: NEGATIVE
IMM GRANULOCYTES # BLD AUTO: 0.04 X10(3)/MCL (ref 0–0.04)
IMM GRANULOCYTES NFR BLD AUTO: 0.6 %
KETONES UR QL STRIP: NEGATIVE
LEUKOCYTE ESTERASE UR QL STRIP: NEGATIVE
LYMPHOCYTES # BLD AUTO: 1.39 X10(3)/MCL (ref 0.6–4.6)
LYMPHOCYTES NFR BLD AUTO: 22.1 %
MCH RBC QN AUTO: 22.3 PG (ref 27–31)
MCHC RBC AUTO-ENTMCNC: 30.4 G/DL (ref 33–36)
MCV RBC AUTO: 73.3 FL (ref 80–94)
MONOCYTES # BLD AUTO: 0.52 X10(3)/MCL (ref 0.1–1.3)
MONOCYTES NFR BLD AUTO: 8.3 %
MUCOUS THREADS URNS QL MICRO: ABNORMAL /LPF
NEUTROPHILS # BLD AUTO: 3.65 X10(3)/MCL (ref 2.1–9.2)
NEUTROPHILS NFR BLD AUTO: 57.8 %
NITRITE UR QL STRIP: NEGATIVE
NRBC BLD AUTO-RTO: 1.3 %
PH UR STRIP: 5.5 [PH]
PHOSPHATE SERPL-MCNC: 3.1 MG/DL (ref 2.3–4.7)
PLATELET # BLD AUTO: 153 X10(3)/MCL (ref 130–400)
PLATELETS.RETICULATED NFR BLD AUTO: 6.8 % (ref 0.9–11.2)
PMV BLD AUTO: ABNORMAL FL
POTASSIUM SERPL-SCNC: 4.1 MMOL/L (ref 3.5–5.1)
PROT SERPL-MCNC: 7.6 GM/DL (ref 5.8–7.6)
PROT UR QL STRIP: ABNORMAL
PROT UR STRIP-MCNC: 43.9 MG/DL
RBC # BLD AUTO: 4.53 X10(6)/MCL (ref 4.7–6.1)
RBC #/AREA URNS AUTO: ABNORMAL /HPF
SODIUM SERPL-SCNC: 144 MMOL/L (ref 136–145)
SP GR UR STRIP.AUTO: 1.02 (ref 1–1.03)
SQUAMOUS #/AREA URNS LPF: ABNORMAL /HPF
URINE PROTEIN/CREATININE RATIO (OLG): 0.4
UROBILINOGEN UR STRIP-ACNC: 8
WBC # BLD AUTO: 6.3 X10(3)/MCL (ref 4.5–11.5)
WBC #/AREA URNS AUTO: ABNORMAL /HPF

## 2025-02-25 PROCEDURE — 36415 COLL VENOUS BLD VENIPUNCTURE: CPT

## 2025-02-25 PROCEDURE — 81001 URINALYSIS AUTO W/SCOPE: CPT

## 2025-02-25 PROCEDURE — 82570 ASSAY OF URINE CREATININE: CPT

## 2025-02-25 PROCEDURE — 84100 ASSAY OF PHOSPHORUS: CPT

## 2025-02-25 PROCEDURE — 80053 COMPREHEN METABOLIC PANEL: CPT

## 2025-02-25 PROCEDURE — 85025 COMPLETE CBC W/AUTO DIFF WBC: CPT

## 2025-02-26 ENCOUNTER — OFFICE VISIT (OUTPATIENT)
Dept: NEPHROLOGY | Facility: CLINIC | Age: 85
End: 2025-02-26
Payer: MEDICARE

## 2025-02-26 VITALS
HEART RATE: 87 BPM | TEMPERATURE: 98 F | OXYGEN SATURATION: 95 % | RESPIRATION RATE: 18 BRPM | HEIGHT: 71 IN | DIASTOLIC BLOOD PRESSURE: 85 MMHG | WEIGHT: 201 LBS | BODY MASS INDEX: 28.14 KG/M2 | SYSTOLIC BLOOD PRESSURE: 135 MMHG

## 2025-02-26 DIAGNOSIS — N18.31 STAGE 3A CHRONIC KIDNEY DISEASE: Primary | ICD-10-CM

## 2025-02-26 DIAGNOSIS — R80.8 OTHER PROTEINURIA: ICD-10-CM

## 2025-02-26 DIAGNOSIS — I25.10 CORONARY ARTERY DISEASE INVOLVING NATIVE CORONARY ARTERY OF NATIVE HEART WITHOUT ANGINA PECTORIS: ICD-10-CM

## 2025-02-26 DIAGNOSIS — I73.9 PAD (PERIPHERAL ARTERY DISEASE): ICD-10-CM

## 2025-02-26 DIAGNOSIS — I10 PRIMARY HYPERTENSION: Chronic | ICD-10-CM

## 2025-02-26 PROCEDURE — 99999 PR PBB SHADOW E&M-EST. PATIENT-LVL V: CPT | Mod: PBBFAC,,,

## 2025-02-26 PROCEDURE — 3079F DIAST BP 80-89 MM HG: CPT | Mod: CPTII,S$GLB,,

## 2025-02-26 PROCEDURE — 1159F MED LIST DOCD IN RCRD: CPT | Mod: CPTII,S$GLB,,

## 2025-02-26 PROCEDURE — 1101F PT FALLS ASSESS-DOCD LE1/YR: CPT | Mod: CPTII,S$GLB,,

## 2025-02-26 PROCEDURE — 99204 OFFICE O/P NEW MOD 45 MIN: CPT | Mod: S$GLB,,,

## 2025-02-26 PROCEDURE — 3075F SYST BP GE 130 - 139MM HG: CPT | Mod: CPTII,S$GLB,,

## 2025-02-26 PROCEDURE — 3288F FALL RISK ASSESSMENT DOCD: CPT | Mod: CPTII,S$GLB,,

## 2025-02-26 RX ORDER — NAPROXEN SODIUM 220 MG/1
81 TABLET, FILM COATED ORAL DAILY
COMMUNITY

## 2025-02-26 RX ORDER — FUROSEMIDE 20 MG/1
20 TABLET ORAL
Qty: 12 TABLET | Refills: 11 | Status: SHIPPED | OUTPATIENT
Start: 2025-02-26 | End: 2026-02-26

## 2025-02-26 NOTE — PROGRESS NOTES
"[facility] New [facility] Note    HPI  Hung Ugarte, 84 y.o. male, presents to office as a new patient for CKD 3.  History significant for hypertension, coronary artery disease, peripheral arterial disease, bilateral lower leg stenting.  Patient does report taking Aleve 1-2 times weekly for aches and pains.  Denies history of heart attack, stroke, autoimmune disease.  Appears like baseline creatinine 1.0-1.3 since 2018.        Medical Diagnoses:   Past Medical History:   Diagnosis Date    Hypertension      Problem List[1]    Surgical History:   Past Surgical History:   Procedure Laterality Date    INSERTION OF PACEMAKER         Family History:   No family history on file.    Social History:   Social History     Tobacco Use    Smoking status: Former     Types: Cigarettes    Smokeless tobacco: Never   Substance Use Topics    Alcohol use: Not Currently       Allergies:  Review of patient's allergies indicates:  No Known Allergies    Medications:  Current Medications[2]       Review of Systems:    Constitutional: Denies fever, fatigue, generalized weakness  Skin: Denies wounds, no rashes, no itching, no new skin lesions  Respiratory:  + dyspnea on exertion  Cardiovascular: ++ worsening lower extremity edema  Gastrointestional: Denies abdominal pain, nausea, vomiting, diarrhea, or constipation  Genitourinary: Denies dysuria, hematuria, foamy urine, or incontinence; reports able to empty bladder  Musculoskeletal: Denies back or flank pain; + cane for ambulation assistance  Neurological: Denies headaches, dizziness, paresthesias, tremors or focal weakness      Vital Signs:  /85 (BP Location: Left arm, Patient Position: Sitting)   Pulse 87   Temp 97.9 °F (36.6 °C) (Oral)   Resp 18   Ht 5' 11" (1.803 m)   Wt 91.2 kg (201 lb)   SpO2 95%   BMI 28.03 kg/m²   Body mass index is 28.03 kg/m².      Physical Exam:    General: no acute distress, awake, alert  Eyes: conjunctiva clear, eyelids without " swelling  HENT: atraumatic, oropharynx and nasal mucosa patent  Neck: supple, trache midline, full ROM, no JVD  Respiratory: equal, unlabored, clear to auscultation A/P  Cardiovascular: RRR without rub  Edema: +3 bilateral lower extremity  Gastrointestinal: soft, non-tender, non-distended  Musculoskeletal: ROM without new limitation or discomfort  Integumentary: warm, dry; no rashes, wounds, or skin lesions  Neurological: oriented x4, appropriate, no acute deficits; no asterixis      Labs:        Component Value Date/Time     02/25/2025 1108     02/08/2025 0924    K 4.1 02/25/2025 1108    K 4.0 02/08/2025 0924     (H) 02/25/2025 1108     (H) 02/08/2025 0924    CO2 21 (L) 02/25/2025 1108    CO2 20 (L) 02/08/2025 0924    BUN 21.8 02/25/2025 1108    BUN 19.5 02/08/2025 0924    CREATININE 1.31 (H) 02/25/2025 1108    CREATININE 1.30 (H) 02/08/2025 0924    CREATININE 1.64 (H) 10/03/2024 1309    CREATININE 1.23 (H) 10/12/2023 1009    CALCIUM 9.8 02/25/2025 1108    CALCIUM 9.3 02/08/2025 0924    PHOS 3.1 02/25/2025 1108    PHOS 3.8 02/15/2022 1500           Component Value Date/Time    WBC 6.30 02/25/2025 1108    WBC 5.85 02/08/2025 0924    HGB 10.1 (L) 02/25/2025 1108    HGB 9.6 (L) 02/08/2025 0924    HCT 33.2 (L) 02/25/2025 1108    HCT 30.4 (L) 02/08/2025 0924     02/25/2025 1108     02/08/2025 0924       Impression:    1. Stage 3a chronic kidney disease  Ambulatory referral/consult to Nephrology      2. Primary hypertension        3. PAD (peripheral artery disease)        4. Coronary artery disease involving native coronary artery of native heart without angina pectoris        5. 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  Patient reports as of this week swelling has significantly returned.  Noted to have +3 bilateral lower extremity edema.  He also reports dyspnea on exertion and not really able to do much housework without becoming short of breath.  Also reported taking Aleve 1 to 2 times a week--> advised against NSAIDs  Blood pressure well controlled  He does have bilateral lower extremity stents  Mild proteinuria 400 mg/G.  We will monitor trend.  No history of diabetes  maintained on angiotensin receptor blocker.  Mild metabolic acidosis.  Monitor trend  CT abdomen and pelvis from 2019 with bilateral renal cysts      Plan:  Restart Lasix at 20 mg only Monday Wednesdays and Fridays  Labs, urine, and ultrasound with follow up in 2-3 months    Patient sees [facility] next month    Blood pressure goal: consistently less than 130/85      Avoid NSAIDs and COX2 inhibitors: Advil (ibuprofen), Aleve (naproxen), Mobic (meloxicam), Celebrex (celecoxib), Toradol (ketorolac) and Diclofenac (voltaren), Indomethacin (indocin).    Only take tylenol (acetaminophen) occasionally if needed for aches/pains.    Recommend low sodium diet:  Less than 2,000 mg per day  Avoid high salt foods (olives, pickles, smoked meats, deli meats, salted potato chips, fast food, etc.).   Do not add salt to your food at the table.   Use only small amounts of salt when cooking      Avoid alcohol and soda. Limit tea and coffee.     Stay well hydrated with water      For more education on kidney disease you can visit:  [email or web address]    JOHN Carmichael      This note was created with the assistance of [employer] voice recognition software or phone dictation. There may be transcription errors as a result of using this technology however minimal. Effort has been made to assure accuracy of transcription but any obvious errors or omissions should be clarified with the author of the document.         [1]   Patient Active Problem List  Diagnosis     CAD (coronary artery disease)    PAD (peripheral artery disease)    Atrial fibrillation, chronic    History of tobacco abuse    Gout    DDD (degenerative disc disease), lumbosacral    Primary hypertension    Primary insomnia    Stage 3a chronic kidney disease    Other proteinuria   [2]   Current Outpatient Medications:     allopurinoL (ZYLOPRIM) 300 MG tablet, Take 1 tablet (300 mg total) by mouth once daily., Disp: 90 tablet, Rfl: 2    amLODIPine (NORVASC) 10 MG tablet, Take 1 tablet (10 mg total) by mouth once daily., Disp: 90 tablet, Rfl: 2    aspirin 81 MG Chew, Take 81 mg by mouth once daily., Disp: , Rfl:     atorvastatin (LIPITOR) 40 MG tablet, Take 1 tablet (40 mg total) by mouth every evening., Disp: 90 tablet, Rfl: 2    carvediloL (COREG) 3.125 MG tablet, Take 3.125 mg by mouth 2 (two) times daily., Disp: , Rfl:     diclofenac sodium (VOLTAREN) 1 % Gel, Apply 2 g topically 3 (three) times daily as needed (pain)., Disp: 100 g, Rfl: 1    ELIQUIS 5 mg Tab, Take 5 mg by mouth 2 (two) times daily., Disp: , Rfl:     furosemide (LASIX) 20 MG tablet, Take 20 mg by mouth., Disp: , Rfl:     HYDROcodone-acetaminophen (NORCO) 5-325 mg per tablet, Take 1 tablet by mouth every 6 (six) hours as needed for Pain., Disp: 12 tablet, Rfl: 0    isosorbide mononitrate (IMDUR) 30 MG 24 hr tablet, Take 30 mg by mouth., Disp: , Rfl:     isosorbide mononitrate (IMDUR) 60 MG 24 hr tablet, Take 1 tablet (60 mg total) by mouth once daily., Disp: 90 tablet, Rfl: 1    levocetirizine (XYZAL) 5 MG tablet, Take 1 tablet (5 mg total) by mouth every evening., Disp: 90 tablet, Rfl: 3    losartan (COZAAR) 100 MG tablet, Take 1 tablet (100 mg total) by mouth once daily., Disp: 90 tablet, Rfl: 1    traZODone (DESYREL) 50 MG tablet, TAKE ONE TABLET BY MOUTH AT BEDTIME, Disp: 30 tablet, Rfl: 3

## 2025-02-26 NOTE — PATIENT INSTRUCTIONS
Start taking furosemide (Lasix) 20 mg on MONDAYS WEDNESDAYS AND FRIDAYS in the morning    This is a fluid pill to help the swelling in your legs and the shortness of breath.    Make sure to follow up with heart doctor next month.    Someone we will call you within the next month to schedule an ultrasound of your kidneys to check on those cysts      Blood pressure goal: consistently less than 130/85      Avoid NSAIDs and COX2 inhibitors: Advil (ibuprofen), Aleve (naproxen), Mobic (meloxicam), Celebrex (celecoxib), Toradol (ketorolac) and Diclofenac (voltaren), Indomethacin (indocin).    Only take tylenol (acetaminophen) occasionally if needed for aches/pains.    Recommend low sodium diet:  Less than 2,000 mg per day  Avoid high salt foods (olives, pickles, smoked meats, deli meats, salted potato chips, fast food, etc.).   Do not add salt to your food at the table.   Use only small amounts of salt when cooking.      Stay well hydrated with water

## 2025-05-08 ENCOUNTER — HOSPITAL ENCOUNTER (OUTPATIENT)
Facility: HOSPITAL | Age: 85
Discharge: HOME | End: 2025-05-09
Attending: INTERNAL MEDICINE | Admitting: INTERNAL MEDICINE
Payer: MEDICARE

## 2025-05-08 DIAGNOSIS — R94.8 ABNORMAL PET SCAN, MEDIASTINUM: ICD-10-CM

## 2025-05-08 DIAGNOSIS — I24.0 CORONARY ARTERY OCCLUSION: ICD-10-CM

## 2025-05-08 DIAGNOSIS — I73.9 PERIPHERAL VASCULAR DISEASE, UNSPECIFIED: ICD-10-CM

## 2025-05-08 DIAGNOSIS — I25.10 CAD (CORONARY ARTERY DISEASE): ICD-10-CM

## 2025-05-08 DIAGNOSIS — R06.02 SHORTNESS OF BREATH: ICD-10-CM

## 2025-05-08 DIAGNOSIS — R07.9 CHEST PAIN: ICD-10-CM

## 2025-05-08 LAB
OHS QRS DURATION: 192 MS
OHS QTC CALCULATION: 510 MS

## 2025-05-08 PROCEDURE — 85347 COAGULATION TIME ACTIVATED: CPT | Performed by: INTERNAL MEDICINE

## 2025-05-08 PROCEDURE — 27201423 OPTIME MED/SURG SUP & DEVICES STERILE SUPPLY: Performed by: INTERNAL MEDICINE

## 2025-05-08 PROCEDURE — G0378 HOSPITAL OBSERVATION PER HR: HCPCS

## 2025-05-08 PROCEDURE — 92978 ENDOLUMINL IVUS OCT C 1ST: CPT | Mod: LD | Performed by: INTERNAL MEDICINE

## 2025-05-08 PROCEDURE — 93799 UNLISTED CV SVC/PROCEDURE: CPT | Mod: LC

## 2025-05-08 PROCEDURE — 93799 UNLISTED CV SVC/PROCEDURE: CPT | Performed by: INTERNAL MEDICINE

## 2025-05-08 PROCEDURE — C1894 INTRO/SHEATH, NON-LASER: HCPCS | Performed by: INTERNAL MEDICINE

## 2025-05-08 PROCEDURE — C1760 CLOSURE DEV, VASC: HCPCS | Performed by: INTERNAL MEDICINE

## 2025-05-08 PROCEDURE — C1887 CATHETER, GUIDING: HCPCS | Performed by: INTERNAL MEDICINE

## 2025-05-08 PROCEDURE — 92921 HC PTCA , ADD'L BRANCH: CPT | Mod: LD | Performed by: INTERNAL MEDICINE

## 2025-05-08 PROCEDURE — 99152 MOD SED SAME PHYS/QHP 5/>YRS: CPT | Performed by: INTERNAL MEDICINE

## 2025-05-08 PROCEDURE — 93010 ELECTROCARDIOGRAM REPORT: CPT | Mod: ,,, | Performed by: INTERNAL MEDICINE

## 2025-05-08 PROCEDURE — 93005 ELECTROCARDIOGRAM TRACING: CPT

## 2025-05-08 PROCEDURE — C9600 PERC DRUG-EL COR STENT SING: HCPCS | Mod: LD | Performed by: INTERNAL MEDICINE

## 2025-05-08 PROCEDURE — 25500020 PHARM REV CODE 255: Performed by: INTERNAL MEDICINE

## 2025-05-08 PROCEDURE — C1769 GUIDE WIRE: HCPCS | Performed by: INTERNAL MEDICINE

## 2025-05-08 PROCEDURE — 93458 L HRT ARTERY/VENTRICLE ANGIO: CPT | Mod: XU | Performed by: INTERNAL MEDICINE

## 2025-05-08 PROCEDURE — 63600175 PHARM REV CODE 636 W HCPCS: Performed by: INTERNAL MEDICINE

## 2025-05-08 PROCEDURE — 75716 ARTERY X-RAYS ARMS/LEGS: CPT | Mod: XU | Performed by: INTERNAL MEDICINE

## 2025-05-08 PROCEDURE — 36246 INS CATH ABD/L-EXT ART 2ND: CPT | Mod: XU,LT | Performed by: INTERNAL MEDICINE

## 2025-05-08 PROCEDURE — 25000003 PHARM REV CODE 250: Performed by: INTERNAL MEDICINE

## 2025-05-08 PROCEDURE — C1753 CATH, INTRAVAS ULTRASOUND: HCPCS | Performed by: INTERNAL MEDICINE

## 2025-05-08 PROCEDURE — 99153 MOD SED SAME PHYS/QHP EA: CPT | Performed by: INTERNAL MEDICINE

## 2025-05-08 PROCEDURE — C1874 STENT, COATED/COV W/DEL SYS: HCPCS | Performed by: INTERNAL MEDICINE

## 2025-05-08 PROCEDURE — C1725 CATH, TRANSLUMIN NON-LASER: HCPCS | Performed by: INTERNAL MEDICINE

## 2025-05-08 DEVICE — EVEROLIMUS-ELUTING PLATINUM CHROMIUM CORONARY STENT SYSTEM
Type: IMPLANTABLE DEVICE | Site: HEART | Status: FUNCTIONAL
Brand: SYNERGY™ XD

## 2025-05-08 RX ORDER — ISOSORBIDE MONONITRATE 60 MG/1
60 TABLET, EXTENDED RELEASE ORAL DAILY
Status: DISCONTINUED | OUTPATIENT
Start: 2025-05-09 | End: 2025-05-09 | Stop reason: HOSPADM

## 2025-05-08 RX ORDER — CLOPIDOGREL BISULFATE 75 MG/1
75 TABLET ORAL DAILY
Qty: 90 TABLET | Refills: 3 | Status: SHIPPED | OUTPATIENT
Start: 2025-05-08 | End: 2026-05-08

## 2025-05-08 RX ORDER — CLOPIDOGREL BISULFATE 75 MG/1
75 TABLET ORAL DAILY
Status: DISCONTINUED | OUTPATIENT
Start: 2025-05-09 | End: 2025-05-09 | Stop reason: HOSPADM

## 2025-05-08 RX ORDER — ATORVASTATIN CALCIUM 40 MG/1
40 TABLET, FILM COATED ORAL NIGHTLY
Status: DISCONTINUED | OUTPATIENT
Start: 2025-05-08 | End: 2025-05-09 | Stop reason: HOSPADM

## 2025-05-08 RX ORDER — IOPAMIDOL 755 MG/ML
INJECTION, SOLUTION INTRAVASCULAR
Status: DISCONTINUED | OUTPATIENT
Start: 2025-05-08 | End: 2025-05-08 | Stop reason: HOSPADM

## 2025-05-08 RX ORDER — SODIUM CHLORIDE 9 MG/ML
INJECTION, SOLUTION INTRAVENOUS CONTINUOUS
Status: ACTIVE | OUTPATIENT
Start: 2025-05-08 | End: 2025-05-08

## 2025-05-08 RX ORDER — LOSARTAN POTASSIUM 50 MG/1
100 TABLET ORAL DAILY
Status: DISCONTINUED | OUTPATIENT
Start: 2025-05-09 | End: 2025-05-09 | Stop reason: HOSPADM

## 2025-05-08 RX ORDER — DIAZEPAM 5 MG/1
10 TABLET ORAL
Status: DISCONTINUED | OUTPATIENT
Start: 2025-05-08 | End: 2025-05-08 | Stop reason: HOSPADM

## 2025-05-08 RX ORDER — DIPHENHYDRAMINE HCL 25 MG
50 CAPSULE ORAL
Status: DISCONTINUED | OUTPATIENT
Start: 2025-05-08 | End: 2025-05-08 | Stop reason: HOSPADM

## 2025-05-08 RX ORDER — HYDROCODONE BITARTRATE AND ACETAMINOPHEN 5; 325 MG/1; MG/1
1 TABLET ORAL EVERY 4 HOURS PRN
Refills: 0 | Status: DISCONTINUED | OUTPATIENT
Start: 2025-05-08 | End: 2025-05-09 | Stop reason: HOSPADM

## 2025-05-08 RX ORDER — ASPIRIN 81 MG/1
81 TABLET ORAL DAILY
Status: DISCONTINUED | OUTPATIENT
Start: 2025-05-09 | End: 2025-05-09 | Stop reason: HOSPADM

## 2025-05-08 RX ORDER — AMLODIPINE BESYLATE 5 MG/1
10 TABLET ORAL DAILY
Status: DISCONTINUED | OUTPATIENT
Start: 2025-05-09 | End: 2025-05-09 | Stop reason: HOSPADM

## 2025-05-08 RX ORDER — CLOPIDOGREL BISULFATE 300 MG/1
600 TABLET, FILM COATED ORAL ONCE
Status: COMPLETED | OUTPATIENT
Start: 2025-05-08 | End: 2025-05-08

## 2025-05-08 RX ORDER — ONDANSETRON HYDROCHLORIDE 2 MG/ML
4 INJECTION, SOLUTION INTRAVENOUS EVERY 6 HOURS PRN
Status: DISCONTINUED | OUTPATIENT
Start: 2025-05-08 | End: 2025-05-09 | Stop reason: HOSPADM

## 2025-05-08 RX ORDER — LIDOCAINE HYDROCHLORIDE 10 MG/ML
INJECTION, SOLUTION INFILTRATION; PERINEURAL
Status: DISCONTINUED | OUTPATIENT
Start: 2025-05-08 | End: 2025-05-08 | Stop reason: HOSPADM

## 2025-05-08 RX ORDER — HEPARIN SODIUM 1000 [USP'U]/ML
INJECTION, SOLUTION INTRAVENOUS; SUBCUTANEOUS
Status: DISCONTINUED | OUTPATIENT
Start: 2025-05-08 | End: 2025-05-08 | Stop reason: HOSPADM

## 2025-05-08 RX ORDER — NITROGLYCERIN 0.4 MG/1
0.4 TABLET SUBLINGUAL EVERY 5 MIN PRN
Status: DISCONTINUED | OUTPATIENT
Start: 2025-05-08 | End: 2025-05-09 | Stop reason: HOSPADM

## 2025-05-08 RX ORDER — MORPHINE SULFATE 4 MG/ML
2 INJECTION, SOLUTION INTRAMUSCULAR; INTRAVENOUS EVERY 4 HOURS PRN
Refills: 0 | Status: DISCONTINUED | OUTPATIENT
Start: 2025-05-08 | End: 2025-05-09 | Stop reason: HOSPADM

## 2025-05-08 RX ORDER — CARVEDILOL 3.12 MG/1
3.12 TABLET ORAL 2 TIMES DAILY
Status: DISCONTINUED | OUTPATIENT
Start: 2025-05-08 | End: 2025-05-09 | Stop reason: HOSPADM

## 2025-05-08 RX ORDER — MIDAZOLAM HYDROCHLORIDE 1 MG/ML
INJECTION INTRAMUSCULAR; INTRAVENOUS
Status: DISCONTINUED | OUTPATIENT
Start: 2025-05-08 | End: 2025-05-08 | Stop reason: HOSPADM

## 2025-05-08 RX ORDER — CEFAZOLIN SODIUM 1 G/3ML
INJECTION, POWDER, FOR SOLUTION INTRAMUSCULAR; INTRAVENOUS
Status: DISCONTINUED | OUTPATIENT
Start: 2025-05-08 | End: 2025-05-08 | Stop reason: HOSPADM

## 2025-05-08 RX ORDER — ACETAMINOPHEN 325 MG/1
650 TABLET ORAL EVERY 4 HOURS PRN
Status: DISCONTINUED | OUTPATIENT
Start: 2025-05-08 | End: 2025-05-09 | Stop reason: HOSPADM

## 2025-05-08 RX ORDER — HYDRALAZINE HYDROCHLORIDE 20 MG/ML
10 INJECTION INTRAMUSCULAR; INTRAVENOUS EVERY 4 HOURS PRN
Status: DISCONTINUED | OUTPATIENT
Start: 2025-05-08 | End: 2025-05-09 | Stop reason: HOSPADM

## 2025-05-08 RX ORDER — FENTANYL CITRATE 50 UG/ML
INJECTION, SOLUTION INTRAMUSCULAR; INTRAVENOUS
Status: DISCONTINUED | OUTPATIENT
Start: 2025-05-08 | End: 2025-05-08 | Stop reason: HOSPADM

## 2025-05-08 RX ORDER — NITROGLYCERIN 20 MG/100ML
INJECTION INTRAVENOUS
Status: DISCONTINUED | OUTPATIENT
Start: 2025-05-08 | End: 2025-05-08 | Stop reason: HOSPADM

## 2025-05-08 RX ADMIN — DIPHENHYDRAMINE HYDROCHLORIDE 50 MG: 25 CAPSULE ORAL at 12:05

## 2025-05-08 RX ADMIN — DIAZEPAM 10 MG: 5 TABLET ORAL at 12:05

## 2025-05-08 RX ADMIN — CLOPIDOGREL BISULFATE 600 MG: 300 TABLET, FILM COATED ORAL at 12:05

## 2025-05-08 NOTE — Clinical Note
The catheter was inserted into the ostium   left main. An angiography was performed of the left coronary arteries. Multiple views were taken. JL4

## 2025-05-08 NOTE — DISCHARGE INSTRUCTIONS
Post-Procedure Care Instructions  Remove the Dressing: Take off the dressing after 24 hours.  Driving: Avoid driving for the next two days.  Lifting: Do not lift anything heavier than a gallon of milk for five days.  Bathing: If you have a groin site, avoid taking tub baths for five days.  Skin Care: Refrain from using lotions, powders, or creams around the site for five days.  Emergency Signs: Go to the nearest emergency room if you develop a fever, notice any discharge from the site, or if the site appears red or swollen.  Bleeding: If the site starts to bleed, lie flat on the ground, apply pressure to the area, and call 911 immediately.  Resume Eliquis tomorrow if no bleeding or hematoma

## 2025-05-08 NOTE — DISCHARGE SUMMARY
[facility]  Discharge Note  Short Stay    Procedure(s) (LRB):  Angiogram, Coronary, with Left Heart Cath (N/A)  Peripheral angiography (N/A)      OUTCOME: Patient tolerated treatment/procedure well without complication and is now ready for discharge.    DISPOSITION: Home or Self Care    FINAL DIAGNOSIS:  <principal problem not specified>    FOLLOWUP: In clinic    DISCHARGE INSTRUCTIONS:  No discharge procedures on file.     TIME SPENT ON DISCHARGE:

## 2025-05-08 NOTE — NURSING
At 1600 site check noted small amount of blood on gauze at sheath site, area of blood marked  Returned to check site and noted gauze covered with blood  Removed dressing and noted slow oozing  Held pressure, slow ooze continued but slowed  Held pressure for 10 minutes  Quikclot and tegaderm applied  Sandbag in place  Reminded pt to keep his leg still and his head on the pillow

## 2025-05-08 NOTE — INTERVAL H&P NOTE
Patient name: Hnug Ugarte  MRN: [id number]  : 1940  Cath Lab Procedure H&P Update    Pre-Procedure Assessment:    I saw and examined the patient face to face. The patient has been re-evaluated and his condition is unchanged. The reason for admission, procedure and care is still present.  Based on the patients H&P, pre-procedure physical exam, relevant diagnostic studies, NPO status and information obtained from the patient, I determined the patient is an appropriate candidate for the proposed procedure and anesthesia planned. I further certify the anesthesia risks, benefits and options have been explained to the patient to which he agrees as documented on the procedural consent.

## 2025-05-08 NOTE — Clinical Note
The catheter was inserted into the aorta. An angiography was performed of the Distal aorta and bilat lower extremities. The angiography was performed via power injection.

## 2025-05-08 NOTE — Clinical Note
The catheter was inserted into the ostium   right coronary artery. An angiography was performed of the right coronary arteries. Multiple views were taken. LUISA

## 2025-05-09 ENCOUNTER — TELEPHONE (OUTPATIENT)
Dept: INTERNAL MEDICINE | Facility: CLINIC | Age: 85
End: 2025-05-09
Payer: MEDICARE

## 2025-05-09 LAB
ANION GAP SERPL CALC-SCNC: 8 MEQ/L
BASOPHILS # BLD AUTO: 0.06 X10(3)/MCL
BASOPHILS NFR BLD AUTO: 0.8 %
BUN SERPL-MCNC: 25.8 MG/DL (ref 8.4–25.7)
CALCIUM SERPL-MCNC: 9.4 MG/DL (ref 8.8–10)
CHLORIDE SERPL-SCNC: 111 MMOL/L (ref 98–107)
CO2 SERPL-SCNC: 21 MMOL/L (ref 23–31)
CREAT SERPL-MCNC: 0.97 MG/DL (ref 0.72–1.25)
CREAT/UREA NIT SERPL: 27
EOSINOPHIL # BLD AUTO: 0.59 X10(3)/MCL (ref 0–0.9)
EOSINOPHIL NFR BLD AUTO: 8.1 %
ERYTHROCYTE [DISTWIDTH] IN BLOOD BY AUTOMATED COUNT: 27.2 % (ref 11.5–17)
GFR SERPLBLD CREATININE-BSD FMLA CKD-EPI: >60 ML/MIN/1.73/M2
GLUCOSE SERPL-MCNC: 95 MG/DL (ref 82–115)
HCT VFR BLD AUTO: 30.5 % (ref 42–52)
HGB BLD-MCNC: 9.3 G/DL (ref 14–18)
IMM GRANULOCYTES # BLD AUTO: 0.03 X10(3)/MCL (ref 0–0.04)
IMM GRANULOCYTES NFR BLD AUTO: 0.4 %
LYMPHOCYTES # BLD AUTO: 0.75 X10(3)/MCL (ref 0.6–4.6)
LYMPHOCYTES NFR BLD AUTO: 10.3 %
MCH RBC QN AUTO: 22 PG (ref 27–31)
MCHC RBC AUTO-ENTMCNC: 30.5 G/DL (ref 33–36)
MCV RBC AUTO: 72.3 FL (ref 80–94)
MONOCYTES # BLD AUTO: 0.59 X10(3)/MCL (ref 0.1–1.3)
MONOCYTES NFR BLD AUTO: 8.1 %
NEUTROPHILS # BLD AUTO: 5.26 X10(3)/MCL (ref 2.1–9.2)
NEUTROPHILS NFR BLD AUTO: 72.3 %
NRBC BLD AUTO-RTO: 0.4 %
PLATELET # BLD AUTO: 162 X10(3)/MCL (ref 130–400)
PLATELETS.RETICULATED NFR BLD AUTO: 5.7 % (ref 0.9–11.2)
PMV BLD AUTO: ABNORMAL FL
POTASSIUM SERPL-SCNC: 4.4 MMOL/L (ref 3.5–5.1)
RBC # BLD AUTO: 4.22 X10(6)/MCL (ref 4.7–6.1)
SODIUM SERPL-SCNC: 140 MMOL/L (ref 136–145)
WBC # BLD AUTO: 7.28 X10(3)/MCL (ref 4.5–11.5)

## 2025-05-09 PROCEDURE — G0378 HOSPITAL OBSERVATION PER HR: HCPCS

## 2025-05-09 PROCEDURE — 85025 COMPLETE CBC W/AUTO DIFF WBC: CPT | Performed by: INTERNAL MEDICINE

## 2025-05-09 PROCEDURE — 25000003 PHARM REV CODE 250: Performed by: INTERNAL MEDICINE

## 2025-05-09 PROCEDURE — 36415 COLL VENOUS BLD VENIPUNCTURE: CPT | Performed by: INTERNAL MEDICINE

## 2025-05-09 PROCEDURE — 80048 BASIC METABOLIC PNL TOTAL CA: CPT | Performed by: INTERNAL MEDICINE

## 2025-05-09 RX ORDER — NITROGLYCERIN 0.4 MG/1
0.4 TABLET SUBLINGUAL EVERY 5 MIN PRN
Qty: 25 TABLET | Refills: 11 | Status: SHIPPED | OUTPATIENT
Start: 2025-05-09 | End: 2026-05-09

## 2025-05-09 RX ADMIN — APIXABAN 5 MG: 5 TABLET, FILM COATED ORAL at 08:05

## 2025-05-09 RX ADMIN — ISOSORBIDE MONONITRATE 60 MG: 60 TABLET, EXTENDED RELEASE ORAL at 08:05

## 2025-05-09 RX ADMIN — LOSARTAN POTASSIUM 100 MG: 50 TABLET, FILM COATED ORAL at 08:05

## 2025-05-09 RX ADMIN — ASPIRIN 81 MG: 81 TABLET, COATED ORAL at 08:05

## 2025-05-09 RX ADMIN — CARVEDILOL 3.12 MG: 3.12 TABLET, FILM COATED ORAL at 08:05

## 2025-05-09 RX ADMIN — CLOPIDOGREL 75 MG: 75 TABLET ORAL at 08:05

## 2025-05-09 RX ADMIN — AMLODIPINE BESYLATE 10 MG: 5 TABLET ORAL at 08:05

## 2025-05-09 NOTE — PLAN OF CARE
05/09/25 1114   Final Note   Assessment Type Final Discharge Note   Anticipated Discharge Disposition Home   Post-Acute Status   Discharge Delays None known at this time     Pt will dc to home .  No needs noted for CM.

## 2025-05-09 NOTE — DISCHARGE SUMMARY
[facility]  Cardiology  Discharge Summary    Patient Name: Hung Ugarte  MRN: [id number]  Admission Date: 5/8/2025  Hospital Length of Stay: 0 days  Code Status: No Order   Attending Physician: Yao Daniels MD   Primary Care Physician: Jonelle Lin FNP  Expected Discharge Date: 5/8/2025  Principal Problem:<principal problem not specified>    Subjective:     Hospital Course:   This is an 84-year-old male, who is known to Dr. Daniels, with a history of CAD, AFib, sick sinus syndrome/ppm, PVD, MICKEY, CKD, P HTN, HLD, venous reflux.  He presented to [facility] on [id number] left heart catheterization and peripheral angiogram.  Patient underwent successful left heart catheterization with stenting to LAD.  He also had diagnostic peripheral angiogram.  Options for intervention/treatment of PVD we discussed at clinic.  Right groin benign.  Patient will be discharged home on triple therapy until seen in clinic.  F/U with Dr. Daniels on 5.15.25.  Patient is stable for discharge.    Review of Systems   Constitutional: Negative for chills and fever.   Cardiovascular:  Negative for chest pain and palpitations.   Respiratory:  Negative for shortness of breath.    Psychiatric/Behavioral:  Negative for altered mental status.            Objective:     Vital Signs (Most Recent):  Temp: 97.5 °F (36.4 °C) (05/09/25 0741)  Pulse: 61 (05/09/25 0741)  Resp: 18 (05/09/25 0741)  BP: 138/68 (05/09/25 0741)  SpO2: (!) 91 % (05/09/25 0741) Vital Signs (24h Range):  Temp:  [97.5 °F (36.4 °C)-98.2 °F (36.8 °C)] 97.5 °F (36.4 °C)  Pulse:  [60-84] 61  Resp:  [9-95] 18  SpO2:  [89 %-100 %] 91 %  BP: (127-177)/() 138/68     Weight: 84 kg (185 lb 3 oz)  Body mass index is 25.83 kg/m².    SpO2: (!) 91 %       No intake or output data in the 24 hours ending 05/09/25 0828    Lines/Drains/Airways       Peripheral Intravenous Line  Duration                  Peripheral IV - Single Lumen 05/08/25 1140 20 G 1 1/4 in No Left  "Antecubital <1 day                    Significant Labs:   Chemistries:   Recent Labs   Lab 05/09/25  0557      K 4.4   *   CO2 21*   BUN 25.8*   CREATININE 0.97   CALCIUM 9.4        CBC/Anemia Labs: Coags:    Recent Labs   Lab 05/09/25  0557   WBC 7.28   HGB 9.3*   HCT 30.5*      MCV 72.3*   RDW 27.2*    No results for input(s): "PT", "INR", "APTT" in the last 168 hours.       Telemetry:  SR    Physical Exam:  Physical Exam  Constitutional:       Appearance: Normal appearance.   HENT:      Head: Normocephalic.   Eyes:      Extraocular Movements: Extraocular movements intact.      Conjunctiva/sclera: Conjunctivae normal.   Cardiovascular:      Rate and Rhythm: Normal rate and regular rhythm.      Pulses: Normal pulses.      Heart sounds: Normal heart sounds.   Pulmonary:      Effort: Pulmonary effort is normal.      Breath sounds: Normal breath sounds.   Abdominal:      Palpations: Abdomen is soft.   Musculoskeletal:         General: Normal range of motion.      Cervical back: Neck supple.   Skin:     General: Skin is warm and dry.      Comments: R Groin Soft/Flat, Non-Tender, No Sign of Bleed/Infection. +2 BLE Palpable Pedal Pulses     Neurological:      Mental Status: He is alert and oriented to person, place, and time. Mental status is at baseline.   Psychiatric:         Mood and Affect: Mood normal.         Behavior: Behavior normal.         Current Inpatient Medications:  Current Medications[1]        Assessment:   IMPRESSION:  CAD/stent  PAD  PAF  SSS/PPM  HTN  HLD  CKD  Chronic Anemia  MICKEY      Plan:   PLAN:  DC home today  Continue DAPT  Resume Eliquis  Right groin precautions/activity restrictions  F/U with Dr. Daniels on 5.15.25 @ 7542    DISCHARGE PLAN:  Discharge: Home  Discharge Diet: Cardiac, Low Sodium  Discharge Condition: Stable  Discharge Activity:  As Tolerated, No Heavy Lifting > 5 lbs., Notify MD with Symptoms of Bleed/Infection  Discharge Time: 36 minutes    Discharge " Medications:     Medication List        START taking these medications      clopidogreL 75 mg tablet  Commonly known as: PLAVIX  Take 1 tablet (75 mg total) by mouth once daily.     nitroGLYCERIN 0.4 MG SL tablet  Commonly known as: NITROSTAT  Place 1 tablet (0.4 mg total) under the tongue every 5 (five) minutes as needed for Chest pain.            CONTINUE taking these medications      allopurinoL 300 MG tablet  Commonly known as: ZYLOPRIM  Take 1 tablet (300 mg total) by mouth once daily.     amLODIPine 10 MG tablet  Commonly known as: NORVASC  Take 1 tablet (10 mg total) by mouth once daily.     aspirin 81 MG EC tablet  Commonly known as: ECOTRIN     atorvastatin 40 MG tablet  Commonly known as: LIPITOR  Take 1 tablet (40 mg total) by mouth every evening.     carvediloL 3.125 MG tablet  Commonly known as: COREG     diclofenac sodium 1 % Gel  Commonly known as: VOLTAREN  Apply 2 g topically 3 (three) times daily as needed (pain).     ELIQUIS 5 mg Tab  Generic drug: apixaban     furosemide 20 MG tablet  Commonly known as: LASIX  Take 1 tablet (20 mg total) by mouth every Mon, Wed, Fri.     HYDROcodone-acetaminophen 5-325 mg per tablet  Commonly known as: NORCO  Take 1 tablet by mouth every 6 (six) hours as needed for Pain.     * isosorbide mononitrate 30 MG 24 hr tablet  Commonly known as: IMDUR     * isosorbide mononitrate 60 MG 24 hr tablet  Commonly known as: IMDUR  Take 1 tablet (60 mg total) by mouth once daily.     levocetirizine 5 MG tablet  Commonly known as: XYZAL  Take 1 tablet (5 mg total) by mouth every evening.     losartan 100 MG tablet  Commonly known as: COZAAR  Take 1 tablet (100 mg total) by mouth once daily.     traZODone 50 MG tablet  Commonly known as: DESYREL  TAKE ONE TABLET BY MOUTH AT BEDTIME           * This list has 2 medication(s) that are the same as other medications prescribed for you. Read the directions carefully, and ask your doctor or other care provider to review them with you.                    Where to Get Your Medications        These medications were sent to [facility] - [address]  [address] [address]      Phone: [phone]   clopidogreL 75 mg tablet  nitroGLYCERIN 0.4 MG SL tablet             Alvarado Davis, [facility]  Cardiology  [facility] - [address] Medical Telemetry  05/09/2025         [1]   Current Facility-Administered Medications:     acetaminophen tablet 650 mg, 650 mg, Oral, Q4H PRN, Yao Daniels MD    amLODIPine tablet 10 mg, 10 mg, Oral, Daily, Yao Daniels MD, 10 mg at 05/09/25 0810    apixaban tablet 5 mg, 5 mg, Oral, BID, Yao Daniels MD, 5 mg at 05/09/25 0810    aspirin EC tablet 81 mg, 81 mg, Oral, Daily, Yao Daniels MD, 81 mg at 05/09/25 0810    atorvastatin tablet 40 mg, 40 mg, Oral, QHS, Yao Daniels MD    carvediloL tablet 3.125 mg, 3.125 mg, Oral, BID, Yao Daniels MD, 3.125 mg at 05/09/25 0810    clopidogreL tablet 75 mg, 75 mg, Oral, Daily, Yao Daniels MD, 75 mg at 05/09/25 0810    hydrALAZINE injection 10 mg, 10 mg, Intravenous, Q4H PRN, Yao Daniels MD    HYDROcodone-acetaminophen 5-325 mg per tablet 1 tablet, 1 tablet, Oral, Q4H PRN, Yao Daniels MD    isosorbide mononitrate 24 hr tablet 60 mg, 60 mg, Oral, Daily, Yao Daniels MD, 60 mg at 05/09/25 0810    losartan tablet 100 mg, 100 mg, Oral, Daily, Yao Daniels MD, 100 mg at 05/09/25 0810    morphine injection 2 mg, 2 mg, Intravenous, Q4H PRN, Yao Daniels MD    nitroGLYCERIN SL tablet 0.4 mg, 0.4 mg, Sublingual, Q5 Min PRN, Yao Daniels MD    ondansetron injection 4 mg, 4 mg, Intravenous, Q6H PRN, Yao Daniels MD

## 2025-05-09 NOTE — PLAN OF CARE
05/09/25 0945   Discharge Assessment   Assessment Type Discharge Planning Assessment   Confirmed/corrected address, phone number and insurance Yes   Confirmed Demographics Correct on Facesheet   Source of Information patient   When was your last doctors appointment? 11/06/24   Does patient/caregiver understand observation status Yes   Communicated JULIO CESAR with patient/caregiver Yes   Reason For Admission pvd, sob   People in Home alone   Facility Arrived From: home   Do you expect to return to your current living situation? Yes   Do you have help at home or someone to help you manage your care at home? Yes   Who are your caregiver(s) and their phone number(s)? fm[facility]   Prior to hospitilization cognitive status: Unable to Assess   Current cognitive status: Alert/Oriented   Walking or Climbing Stairs Difficulty yes   Walking or Climbing Stairs ambulation difficulty, requires equipment   Mobility Management cane prn   Dressing/Bathing Difficulty no   Home Accessibility wheelchair accessible   Equipment Currently Used at Home cane, straight   Readmission within 30 days? No   Patient currently being followed by outpatient case management? No   Do you currently have service(s) that help you manage your care at home? No   Do you take prescription medications? Yes   Do you have any problems affording any of your prescribed medications? No   Is the patient taking medications as prescribed? yes   Who is going to help you get home at discharge? fmly   How do you get to doctors appointments? car, drives self   Are you on dialysis? No   Discharge Plan A Home   Discharge Plan B Home   DME Needed Upon Discharge  none   Discharge Plan discussed with: Patient   Transition of Care Barriers None   Housing Stability   In the last 12 months, was there a time when you were not able to pay the mortgage or rent on time? N   At any time in the past 12 months, were you homeless or living in a shelter (including now)? N   Transportation Needs    In the past 12 months, has lack of transportation kept you from medical appointments or from getting medications? no   In the past 12 months, has lack of transportation kept you from meetings, work, or from getting things needed for daily living? No   Food Insecurity   Within the past 12 months, you worried that your food would run out before you got the money to buy more. Never true   Within the past 12 months, the food you bought just didn't last and you didn't have money to get more. Never true   Utilities   In the past 12 months has the electric, gas, oil, or water company threatened to shut off services in your home? No   Health Literacy   How often do you need to have someone help you when you read instructions, pamphlets, or other written material from your doctor or pharmacy? Sometimes     Pt lives alone, drives, is independent. He uses cane prn . He has not used HH services. No needs noted for cm. Pt will dc to home today .

## 2025-05-09 NOTE — TELEPHONE ENCOUNTER
Copied from CRM #[id number]. Topic: Appointments - Appointment Scheduling  >> May 9, 2025  9:02 AM Kathrin Núñez wrote:  Who Called: Mercedez conklin/ [facility] 9s -[phone]    Caller is requesting assistance/information from provider's office.    Symptoms (please be specific): N/A   How long has patient had these symptoms:  N/A  List of preferred pharmacies on file (remove unneeded): [unfilled]  If different, enter pharmacy into here including location and phone number: N/A      Preferred Method of Contact: Phone Call  Patient's Preferred Phone Number on File: [phone]   Best Call Back Number, if different:  Additional Information: pt is needing a HFU. D/c 5/9/25 [facility]

## 2025-05-09 NOTE — NURSING
Patient was sat up at 1800. Patient was told that he had to stay sitting in bed until 1830. Tiffany KELLY went to check on the patient when she saw an elevated blood pressure at 1802 on the tele monitor. Patient was standing up on the side of the bed attempting to use a urinal. Patient was immediately put back in bed and groin was checked. The gauze had blood on it so that was removed to see the site. It had an ooze of blood. Manual compression was held for approximately 10 minutes until oozing stopped. Quickclot and tegaderm applied. Sandbag was put on top of groin. Patient was educated on importance of staying flat and keeping his leg still. Gabriel KELLY was contacted and he told Dr. Daniels about the incident. Dr. Daniels gave verbal orders to admit the patient.

## 2025-05-13 ENCOUNTER — PATIENT OUTREACH (OUTPATIENT)
Dept: ADMINISTRATIVE | Facility: CLINIC | Age: 85
End: 2025-05-13
Payer: MEDICARE

## 2025-05-13 NOTE — PROGRESS NOTES
C3 nurse spoke with Hung Ugarte who is unable to complete the call at this time. Patient's niece Requested a Callback. Patient does not have a HOSFU. Message routed to PCP's staff assistance needed with scheduling a HOSFU.

## 2025-05-13 NOTE — PROGRESS NOTES
2nd attempt C3 nurse attempted to contact Hung Ugarte's niece. Patient's niece left a VM on inbox. Voicemail not set up. Patient does not have a HOSFU. Message previously routed to PCP's staff assistance needed with scheduling a HOSFU.

## 2025-05-14 VITALS
TEMPERATURE: 98 F | HEIGHT: 71 IN | OXYGEN SATURATION: 94 % | BODY MASS INDEX: 25.93 KG/M2 | DIASTOLIC BLOOD PRESSURE: 68 MMHG | SYSTOLIC BLOOD PRESSURE: 138 MMHG | WEIGHT: 185.19 LBS | HEART RATE: 61 BPM | RESPIRATION RATE: 18 BRPM

## 2025-05-14 NOTE — PROGRESS NOTES
C3 nurse spoke with Hung Ugarte's niece for a TCC post hospital discharge follow up call. The patient does not have a scheduled HOSFU appointment with Jonelle Lin FNP within 5-7 days post hospital discharge date 5/9. C3 nurse was unable to schedule HOSFU appointment in [facility]. Message previously sent to PCP staff requesting they contact patient and schedule follow up appointment. Patient's niece aware of f/u appt with Yao Daniels MD (Cardiology) on 5/15 at 0950.

## 2025-05-21 RX ORDER — TRAZODONE HYDROCHLORIDE 50 MG/1
50 TABLET ORAL NIGHTLY
Qty: 30 TABLET | Refills: 3 | Status: SHIPPED | OUTPATIENT
Start: 2025-05-21

## 2025-06-09 ENCOUNTER — LAB VISIT (OUTPATIENT)
Dept: LAB | Facility: HOSPITAL | Age: 85
End: 2025-06-09
Payer: MEDICARE

## 2025-06-09 DIAGNOSIS — R80.8 OTHER PROTEINURIA: ICD-10-CM

## 2025-06-09 DIAGNOSIS — I25.10 CORONARY ARTERY DISEASE INVOLVING NATIVE CORONARY ARTERY OF NATIVE HEART WITHOUT ANGINA PECTORIS: ICD-10-CM

## 2025-06-09 DIAGNOSIS — I73.9 PAD (PERIPHERAL ARTERY DISEASE): ICD-10-CM

## 2025-06-09 DIAGNOSIS — I10 PRIMARY HYPERTENSION: ICD-10-CM

## 2025-06-09 DIAGNOSIS — N18.31 STAGE 3A CHRONIC KIDNEY DISEASE: ICD-10-CM

## 2025-06-09 LAB
ALBUMIN SERPL-MCNC: 3.8 G/DL (ref 3.4–4.8)
ALBUMIN/GLOB SERPL: 0.9 RATIO (ref 1.1–2)
ALP SERPL-CCNC: 165 UNIT/L (ref 40–150)
ALT SERPL-CCNC: 14 UNIT/L (ref 0–55)
ANION GAP SERPL CALC-SCNC: 7 MEQ/L
ANISOCYTOSIS BLD QL SMEAR: ABNORMAL
AST SERPL-CCNC: 18 UNIT/L (ref 11–45)
BASOPHILS # BLD AUTO: 0.05 X10(3)/MCL
BASOPHILS NFR BLD AUTO: 0.8 %
BILIRUB SERPL-MCNC: 0.7 MG/DL
BUN SERPL-MCNC: 42.4 MG/DL (ref 8.4–25.7)
CALCIUM SERPL-MCNC: 9.9 MG/DL (ref 8.8–10)
CHLORIDE SERPL-SCNC: 115 MMOL/L (ref 98–107)
CO2 SERPL-SCNC: 21 MMOL/L (ref 23–31)
CREAT SERPL-MCNC: 1.57 MG/DL (ref 0.72–1.25)
CREAT UR-MCNC: 100 MG/DL (ref 63–166)
CREAT/UREA NIT SERPL: 27
EOSINOPHIL # BLD AUTO: 1.06 X10(3)/MCL (ref 0–0.9)
EOSINOPHIL NFR BLD AUTO: 16.2 %
ERYTHROCYTE [DISTWIDTH] IN BLOOD BY AUTOMATED COUNT: 26.9 % (ref 11.5–17)
GFR SERPLBLD CREATININE-BSD FMLA CKD-EPI: 43 ML/MIN/1.73/M2
GLOBULIN SER-MCNC: 4.4 GM/DL (ref 2.4–3.5)
GLUCOSE SERPL-MCNC: 115 MG/DL (ref 82–115)
HCT VFR BLD AUTO: 31.8 % (ref 42–52)
HGB BLD-MCNC: 9.5 G/DL (ref 14–18)
IMM GRANULOCYTES # BLD AUTO: 0.03 X10(3)/MCL (ref 0–0.04)
IMM GRANULOCYTES NFR BLD AUTO: 0.5 %
LYMPHOCYTES # BLD AUTO: 1.45 X10(3)/MCL (ref 0.6–4.6)
LYMPHOCYTES NFR BLD AUTO: 22.2 %
MACROCYTES BLD QL SMEAR: ABNORMAL
MCH RBC QN AUTO: 21.8 PG (ref 27–31)
MCHC RBC AUTO-ENTMCNC: 29.9 G/DL (ref 33–36)
MCV RBC AUTO: 73.1 FL (ref 80–94)
MONOCYTES # BLD AUTO: 0.46 X10(3)/MCL (ref 0.1–1.3)
MONOCYTES NFR BLD AUTO: 7 %
NEUTROPHILS # BLD AUTO: 3.48 X10(3)/MCL (ref 2.1–9.2)
NEUTROPHILS NFR BLD AUTO: 53.3 %
NRBC BLD AUTO-RTO: 1.2 %
PLATELET # BLD AUTO: 177 X10(3)/MCL (ref 130–400)
PLATELET # BLD EST: NORMAL 10*3/UL
PLATELETS.RETICULATED NFR BLD AUTO: 5.1 % (ref 0.9–11.2)
PMV BLD AUTO: ABNORMAL FL
POIKILOCYTOSIS BLD QL SMEAR: ABNORMAL
POTASSIUM SERPL-SCNC: 4.1 MMOL/L (ref 3.5–5.1)
PROT SERPL-MCNC: 8.2 GM/DL (ref 5.8–7.6)
PROT UR STRIP-MCNC: 16.2 MG/DL
PTH-INTACT SERPL-MCNC: 110 PG/ML (ref 8.7–77)
RBC # BLD AUTO: 4.35 X10(6)/MCL (ref 4.7–6.1)
RBC MORPH BLD: ABNORMAL
SCHISTOCYTE (OLG): ABNORMAL
SODIUM SERPL-SCNC: 143 MMOL/L (ref 136–145)
TARGETS BLD QL SMEAR: ABNORMAL
TEAR DROP CELL (OLG): SLIGHT
URINE PROTEIN/CREATININE RATIO (OLG): 0.2
WBC # BLD AUTO: 6.53 X10(3)/MCL (ref 4.5–11.5)

## 2025-06-09 PROCEDURE — 80053 COMPREHEN METABOLIC PANEL: CPT

## 2025-06-09 PROCEDURE — 85025 COMPLETE CBC W/AUTO DIFF WBC: CPT

## 2025-06-09 PROCEDURE — 83970 ASSAY OF PARATHORMONE: CPT

## 2025-06-09 PROCEDURE — 84156 ASSAY OF PROTEIN URINE: CPT

## 2025-06-09 PROCEDURE — 36415 COLL VENOUS BLD VENIPUNCTURE: CPT

## 2025-06-13 ENCOUNTER — OFFICE VISIT (OUTPATIENT)
Dept: NEPHROLOGY | Facility: CLINIC | Age: 85
End: 2025-06-13
Payer: MEDICARE

## 2025-06-13 VITALS
WEIGHT: 185.19 LBS | BODY MASS INDEX: 25.93 KG/M2 | HEART RATE: 98 BPM | SYSTOLIC BLOOD PRESSURE: 123 MMHG | HEIGHT: 71 IN | TEMPERATURE: 98 F | RESPIRATION RATE: 19 BRPM | OXYGEN SATURATION: 96 % | DIASTOLIC BLOOD PRESSURE: 64 MMHG

## 2025-06-13 DIAGNOSIS — I48.20 ATRIAL FIBRILLATION, CHRONIC: ICD-10-CM

## 2025-06-13 DIAGNOSIS — I73.9 PAD (PERIPHERAL ARTERY DISEASE): ICD-10-CM

## 2025-06-13 DIAGNOSIS — N18.31 STAGE 3A CHRONIC KIDNEY DISEASE: Primary | ICD-10-CM

## 2025-06-13 DIAGNOSIS — I10 PRIMARY HYPERTENSION: Chronic | ICD-10-CM

## 2025-06-13 DIAGNOSIS — N25.81 SECONDARY HYPERPARATHYROIDISM OF RENAL ORIGIN: ICD-10-CM

## 2025-06-13 DIAGNOSIS — E87.20 METABOLIC ACIDOSIS: ICD-10-CM

## 2025-06-13 DIAGNOSIS — N28.1 BILATERAL RENAL CYSTS: ICD-10-CM

## 2025-06-13 PROCEDURE — 99999 PR PBB SHADOW E&M-EST. PATIENT-LVL V: CPT | Mod: PBBFAC,,,

## 2025-06-13 RX ORDER — SODIUM BICARBONATE 650 MG/1
650 TABLET ORAL 2 TIMES DAILY
Qty: 180 TABLET | Refills: 3 | Status: SHIPPED | OUTPATIENT
Start: 2025-06-13 | End: 2026-06-13

## 2025-06-13 RX ORDER — FERROUS GLUCONATE 324(38)MG
324 TABLET ORAL
COMMUNITY

## 2025-06-13 NOTE — PATIENT INSTRUCTIONS
Continue current medications    Start sodium bicarbonate 650 mg twice daily for acid level of the blood being elevated due to kidney disease      We still need to get an ultrasound of your kidneys.  Someone should be calling you to schedule.  Please schedule with them so we can monitor the cyst that you had previously

## 2025-06-13 NOTE — PROGRESS NOTES
[facility] Nephrology ambulatory Office Note    HPI  Hung Ugarte, 84 y.o. male, presents to office for his 2nd visit for CKD 3.  History significant for hypertension, coronary artery disease, peripheral arterial disease, bilateral lower leg stenting.  Since our last visit he did have angiogram with coronary artery stent placement last month.  He reports swelling has significantly improved since I restarted him on Lasix Mondays Wednesdays Fridays.  He no longer has any shortness of breath.  He has no acute complaints today.  Blood pressure well controlled.      Medical Diagnoses:   Past Medical History:   Diagnosis Date    Atrial fibrillation     CKD (chronic kidney disease)     Coronary artery disease     Hyperlipidemia     Hypertension     Venous reflux      Problem List[1]    Surgical History:   Past Surgical History:   Procedure Laterality Date    ANGIOGRAM, CORONARY, WITH LEFT HEART CATHETERIZATION N/A 5/8/2025    Procedure: Angiogram, Coronary, with Left Heart Cath;  Surgeon: Yao Daniels MD;  Location: [facility] CATH LAB;  Service: Cardiology;  Laterality: N/A;  LHC +/- PCI + JUDAH ANGIO VIA RT GROIN    INSERTION OF PACEMAKER      PERIPHERAL ANGIOGRAPHY N/A 5/8/2025    Procedure: Peripheral angiography;  Surgeon: Yao Daniels MD;  Location: [facility] CATH LAB;  Service: Cardiology;  Laterality: N/A;    TOTAL KNEE ARTHROPLASTY Left        Family History:   No family history on file.    Social History:   Social History     Tobacco Use    Smoking status: Former     Types: Cigarettes    Smokeless tobacco: Never   Substance Use Topics    Alcohol use: Not Currently       Allergies:  Review of patient's allergies indicates:  No Known Allergies    Medications:  Current Medications[2]       Review of Systems:    Constitutional: Denies fever, fatigue, generalized weakness  Skin: Denies wounds, no rashes, no itching, no new skin lesions  Respiratory:  + resolution of shortness of breath  Cardiovascular: ++ significant  "improvement in lower extremity edema  Gastrointestional: Denies abdominal pain, nausea, vomiting, diarrhea, or constipation  Genitourinary: Denies dysuria, hematuria, foamy urine, or incontinence; reports able to empty bladder  Musculoskeletal: Denies back or flank pain; + cane for ambulation assistance  Neurological: Denies headaches, dizziness, paresthesias, tremors or focal weakness      Vital Signs:  /64 (BP Location: Right arm, Patient Position: Sitting)   Pulse 98   Temp 97.5 °F (36.4 °C) (Oral)   Resp 19   Ht 5' 10.98" (1.803 m)   Wt 84 kg (185 lb 3.2 oz)   SpO2 96%   BMI 25.84 kg/m²   Body mass index is 25.84 kg/m².      Physical Exam:    General: no acute distress, awake, alert  Eyes: conjunctiva clear, eyelids without swelling  HENT: atraumatic, oropharynx and nasal mucosa patent  Neck: supple, trache midline, full ROM, no JVD  Respiratory: equal, unlabored, clear to auscultation A/P  Cardiovascular:  Irregularly irregular without rub  Edema:  Trace to +1 bilateral lower extremity  Gastrointestinal: soft, non-tender, non-distended  Musculoskeletal: ROM without new limitation or discomfort  Integumentary: warm, dry; no rashes, wounds, or skin lesions  Neurological: oriented x4, appropriate, no acute deficits; no asterixis      Labs:        Component Value Date/Time     06/09/2025 1109     05/09/2025 0557    K 4.1 06/09/2025 1109    K 4.4 05/09/2025 0557     (H) 06/09/2025 1109     (H) 05/09/2025 0557    CO2 21 (L) 06/09/2025 1109    CO2 21 (L) 05/09/2025 0557    BUN 42.4 (H) 06/09/2025 1109    BUN 25.8 (H) 05/09/2025 0557    CREATININE 1.57 (H) 06/09/2025 1109    CREATININE 0.97 05/09/2025 0557    CREATININE 1.31 (H) 02/25/2025 1108    CREATININE 1.30 (H) 02/08/2025 0924    CALCIUM 9.9 06/09/2025 1109    CALCIUM 9.4 05/09/2025 0557    PHOS 3.1 02/25/2025 1108    PHOS 3.8 02/15/2022 1500    .0 (H) 06/09/2025 1109           Component Value Date/Time    WBC 6.53 " 06/09/2025 1109    WBC 7.28 05/09/2025 0557    HGB 9.5 (L) 06/09/2025 1109    HGB 9.3 (L) 05/09/2025 0557    HCT 31.8 (L) 06/09/2025 1109    HCT 30.5 (L) 05/09/2025 0557     06/09/2025 1109     05/09/2025 0557       Impression:    1. Stage 3a chronic kidney disease        2. Primary hypertension        3. PAD (peripheral artery disease)        4. Atrial fibrillation, chronic        5. Metabolic acidosis        6. Secondary hyperparathyroidism of renal origin            CKD 3A related to chronic ischemic nephrosclerosis, age-related deterioration, potential component of cardiorenal disease  It appears that renal function has fluctuated with cardiac output and diuresis   Renal function stable on Lasix also considering recent angiogram  fluid status much improved since restarting Lasix 3 times weekly    .  Continue to monitor trend  Blood pressure well controlled    Proteinuria 200mg/G.  maintained on angiotensin receptor blocker.  Mild metabolic acidosis remains  CT abdomen and pelvis from 2019 with bilateral renal cysts  Status post angiogram last month with coronary artery stent placement  Maintained on Arb    Plan:  Continue Lasix at 20 mg only Monday Wednesdays and Fridays    He did not complete ultrasound since our last visit.  I will reorder    Start sodium bicarb 650 mg b.i.d.    Patient sees [facility] within the next couple of months.    Follow up in 6 months    Blood pressure goal: consistently less than 130/85      Avoid NSAIDs and COX2 inhibitors: Advil (ibuprofen), Aleve (naproxen), Mobic (meloxicam), Celebrex (celecoxib), Toradol (ketorolac) and Diclofenac (voltaren), Indomethacin (indocin).    Only take tylenol (acetaminophen) occasionally if needed for aches/pains.    Recommend low sodium diet:  Less than 2,000 mg per day  Avoid high salt foods (olives, pickles, smoked meats, deli meats, salted potato chips, fast food, etc.).   Do not add salt to your food at the table.   Use only small  amounts of salt when cooking      Avoid alcohol and soda. Limit tea and coffee.     Stay well hydrated with water      For more education on kidney disease you can visit:  [email or web address]    JOHN Carmichael      This note was created with the assistance of [employer]) voice recognition software or phone dictation. There may be transcription errors as a result of using this technology however minimal. Effort has been made to assure accuracy of transcription but any obvious errors or omissions should be clarified with the author of the document.           [1]   Patient Active Problem List  Diagnosis    CAD (coronary artery disease)    PAD (peripheral artery disease)    Atrial fibrillation, chronic    History of tobacco abuse    Gout    DDD (degenerative disc disease), lumbosacral    Primary hypertension    Primary insomnia    Stage 3a chronic kidney disease    Other proteinuria    Secondary hyperparathyroidism of renal origin    Metabolic acidosis   [2]   Current Outpatient Medications:     allopurinoL (ZYLOPRIM) 300 MG tablet, Take 1 tablet (300 mg total) by mouth once daily., Disp: 90 tablet, Rfl: 2    amLODIPine (NORVASC) 10 MG tablet, Take 1 tablet (10 mg total) by mouth once daily., Disp: 90 tablet, Rfl: 2    aspirin (ECOTRIN) 81 MG EC tablet, Take 81 mg by mouth once daily., Disp: , Rfl:     atorvastatin (LIPITOR) 40 MG tablet, Take 1 tablet (40 mg total) by mouth every evening., Disp: 90 tablet, Rfl: 2    carvediloL (COREG) 3.125 MG tablet, Take 3.125 mg by mouth 2 (two) times daily., Disp: , Rfl:     clopidogreL (PLAVIX) 75 mg tablet, Take 1 tablet (75 mg total) by mouth once daily., Disp: 90 tablet, Rfl: 3    ELIQUIS 5 mg Tab, Take 5 mg by mouth 2 (two) times daily., Disp: , Rfl:     ferrous gluconate (FERGON) 324 MG tablet, Take 324 mg by mouth daily with breakfast., Disp: , Rfl:     furosemide (LASIX) 20 MG tablet, Take 1 tablet (20 mg total) by mouth every Mon, Wed, Fri., Disp: 12  tablet, Rfl: 11    isosorbide mononitrate (IMDUR) 30 MG 24 hr tablet, Take 30 mg by mouth., Disp: , Rfl:     isosorbide mononitrate (IMDUR) 60 MG 24 hr tablet, Take 1 tablet (60 mg total) by mouth once daily., Disp: 90 tablet, Rfl: 1    levocetirizine (XYZAL) 5 MG tablet, Take 1 tablet (5 mg total) by mouth every evening., Disp: 90 tablet, Rfl: 3    losartan (COZAAR) 100 MG tablet, Take 1 tablet (100 mg total) by mouth once daily., Disp: 90 tablet, Rfl: 1    nitroGLYCERIN (NITROSTAT) 0.4 MG SL tablet, Place 1 tablet (0.4 mg total) under the tongue every 5 (five) minutes as needed for Chest pain., Disp: 25 tablet, Rfl: 11    traZODone (DESYREL) 50 MG tablet, Take 1 tablet (50 mg total) by mouth every evening., Disp: 30 tablet, Rfl: 3    diclofenac sodium (VOLTAREN) 1 % Gel, Apply 2 g topically 3 (three) times daily as needed (pain). (Patient not taking: Reported on 6/13/2025), Disp: 100 g, Rfl: 1    HYDROcodone-acetaminophen (NORCO) 5-325 mg per tablet, Take 1 tablet by mouth every 6 (six) hours as needed for Pain. (Patient not taking: Reported on 6/13/2025), Disp: 12 tablet, Rfl: 0

## 2025-06-24 ENCOUNTER — HOSPITAL ENCOUNTER (OUTPATIENT)
Dept: RADIOLOGY | Facility: HOSPITAL | Age: 85
Discharge: HOME | End: 2025-06-24
Payer: MEDICARE

## 2025-06-24 DIAGNOSIS — N28.1 BILATERAL RENAL CYSTS: ICD-10-CM

## 2025-06-24 PROCEDURE — 76770 US EXAM ABDO BACK WALL COMP: CPT | Mod: TC

## 2025-06-25 ENCOUNTER — RESULTS FOLLOW-UP (OUTPATIENT)
Dept: NEPHROLOGY | Facility: CLINIC | Age: 85
End: 2025-06-25

## 2025-06-25 DIAGNOSIS — N28.1 RENAL CYST: Primary | ICD-10-CM

## 2025-07-21 DIAGNOSIS — I10 PRIMARY HYPERTENSION: Chronic | ICD-10-CM

## 2025-07-21 RX ORDER — AMLODIPINE BESYLATE 10 MG/1
10 TABLET ORAL DAILY
Qty: 90 TABLET | Refills: 2 | Status: SHIPPED | OUTPATIENT
Start: 2025-07-21 | End: 2026-04-17

## (undated) DEVICE — KIT SYR REUSABLE

## (undated) DEVICE — KIT MANIFOLD LOW PRESS TUBING

## (undated) DEVICE — GUIDEWIRE SION BLUE STR 190CM

## (undated) DEVICE — Device

## (undated) DEVICE — GUIDE LAUNCHER 6FR EBU 3.5

## (undated) DEVICE — SYS WASTE FLD DISPOSAL 1400ML

## (undated) DEVICE — GUIDE JR4 SH/6FR

## (undated) DEVICE — CATH NC EMERGE MR 3X12MM

## (undated) DEVICE — SUT PERCLOSE PROSTYLE MEDIATE

## (undated) DEVICE — PAD DEFIB CADENCE ADULT R2

## (undated) DEVICE — CATH IMPULSE MP 5FR 145CM

## (undated) DEVICE — TUBING HP AIRLSS ROT ADPT 30IN

## (undated) DEVICE — CATH EAGLE EYE PLATINUM

## (undated) DEVICE — CATH ANGIO OMNI W/10SH 5F .035

## (undated) DEVICE — DEVICE INDEFLATOR BASIX

## (undated) DEVICE — GUIDEWIRE INQWIRE SE 3MM JTIP

## (undated) DEVICE — CANNULA ADULT NASAL 7FT

## (undated) DEVICE — GUIDEWIRE OMNI STR TIP 185CM

## (undated) DEVICE — KIT HAND CONTROL HIGH PRESSUR

## (undated) DEVICE — SHEATH INTRODUCER 5FR 10CM

## (undated) DEVICE — SHEATH INTRODUCER 6FR 11CM